# Patient Record
Sex: MALE | Race: WHITE | Employment: FULL TIME | URBAN - METROPOLITAN AREA
[De-identification: names, ages, dates, MRNs, and addresses within clinical notes are randomized per-mention and may not be internally consistent; named-entity substitution may affect disease eponyms.]

---

## 2017-01-24 PROBLEM — E78.2 MIXED HYPERLIPIDEMIA: Status: ACTIVE | Noted: 2017-01-24

## 2017-01-31 PROBLEM — R07.0 THROAT DISCOMFORT: Status: ACTIVE | Noted: 2017-01-31

## 2022-04-15 ENCOUNTER — OFFICE VISIT (OUTPATIENT)
Dept: FAMILY MEDICINE CLINIC | Facility: CLINIC | Age: 52
End: 2022-04-15
Payer: COMMERCIAL

## 2022-04-15 ENCOUNTER — LAB ENCOUNTER (OUTPATIENT)
Dept: LAB | Age: 52
End: 2022-04-15
Attending: FAMILY MEDICINE
Payer: COMMERCIAL

## 2022-04-15 VITALS
WEIGHT: 267 LBS | BODY MASS INDEX: 36.16 KG/M2 | SYSTOLIC BLOOD PRESSURE: 160 MMHG | HEIGHT: 72 IN | DIASTOLIC BLOOD PRESSURE: 95 MMHG | HEART RATE: 65 BPM

## 2022-04-15 DIAGNOSIS — E78.00 PURE HYPERCHOLESTEROLEMIA: ICD-10-CM

## 2022-04-15 DIAGNOSIS — R73.9 HYPERGLYCEMIA: ICD-10-CM

## 2022-04-15 DIAGNOSIS — Z12.11 COLON CANCER SCREENING: ICD-10-CM

## 2022-04-15 DIAGNOSIS — I10 PRIMARY HYPERTENSION: Primary | ICD-10-CM

## 2022-04-15 LAB
ALBUMIN SERPL-MCNC: 4.5 G/DL (ref 3.4–5)
ALBUMIN/GLOB SERPL: 1.4 {RATIO} (ref 1–2)
ALP LIVER SERPL-CCNC: 61 U/L
ALT SERPL-CCNC: 59 U/L
ANION GAP SERPL CALC-SCNC: 5 MMOL/L (ref 0–18)
AST SERPL-CCNC: 35 U/L (ref 15–37)
BILIRUB SERPL-MCNC: 1 MG/DL (ref 0.1–2)
BUN BLD-MCNC: 11 MG/DL (ref 7–18)
BUN/CREAT SERPL: 11.8 (ref 10–20)
CALCIUM BLD-MCNC: 9.9 MG/DL (ref 8.5–10.1)
CHLORIDE SERPL-SCNC: 105 MMOL/L (ref 98–112)
CHOLEST SERPL-MCNC: 224 MG/DL (ref ?–200)
CO2 SERPL-SCNC: 27 MMOL/L (ref 21–32)
CREAT BLD-MCNC: 0.93 MG/DL
EST. AVERAGE GLUCOSE BLD GHB EST-MCNC: 146 MG/DL (ref 68–126)
FASTING PATIENT LIPID ANSWER: YES
FASTING STATUS PATIENT QL REPORTED: YES
GLOBULIN PLAS-MCNC: 3.2 G/DL (ref 2.8–4.4)
GLUCOSE BLD-MCNC: 129 MG/DL (ref 70–99)
HBA1C MFR BLD: 6.7 % (ref ?–5.7)
HDLC SERPL-MCNC: 46 MG/DL (ref 40–59)
LDLC SERPL CALC-MCNC: 162 MG/DL (ref ?–100)
NONHDLC SERPL-MCNC: 178 MG/DL (ref ?–130)
OSMOLALITY SERPL CALC.SUM OF ELEC: 285 MOSM/KG (ref 275–295)
POTASSIUM SERPL-SCNC: 4.1 MMOL/L (ref 3.5–5.1)
PROT SERPL-MCNC: 7.7 G/DL (ref 6.4–8.2)
SODIUM SERPL-SCNC: 137 MMOL/L (ref 136–145)
TRIGL SERPL-MCNC: 91 MG/DL (ref 30–149)
VLDLC SERPL CALC-MCNC: 18 MG/DL (ref 0–30)

## 2022-04-15 PROCEDURE — 90750 HZV VACC RECOMBINANT IM: CPT | Performed by: FAMILY MEDICINE

## 2022-04-15 PROCEDURE — 3077F SYST BP >= 140 MM HG: CPT | Performed by: FAMILY MEDICINE

## 2022-04-15 PROCEDURE — 36415 COLL VENOUS BLD VENIPUNCTURE: CPT

## 2022-04-15 PROCEDURE — 99203 OFFICE O/P NEW LOW 30 MIN: CPT | Performed by: FAMILY MEDICINE

## 2022-04-15 PROCEDURE — 90471 IMMUNIZATION ADMIN: CPT | Performed by: FAMILY MEDICINE

## 2022-04-15 PROCEDURE — 83036 HEMOGLOBIN GLYCOSYLATED A1C: CPT

## 2022-04-15 PROCEDURE — 80061 LIPID PANEL: CPT

## 2022-04-15 PROCEDURE — 80053 COMPREHEN METABOLIC PANEL: CPT

## 2022-04-15 PROCEDURE — 3008F BODY MASS INDEX DOCD: CPT | Performed by: FAMILY MEDICINE

## 2022-04-15 PROCEDURE — 3080F DIAST BP >= 90 MM HG: CPT | Performed by: FAMILY MEDICINE

## 2022-04-15 RX ORDER — AMLODIPINE BESYLATE 5 MG/1
5 TABLET ORAL DAILY
COMMUNITY
Start: 2022-02-20 | End: 2022-04-15

## 2022-04-15 RX ORDER — OLMESARTAN MEDOXOMIL AND HYDROCHLOROTHIAZIDE 20/12.5 20; 12.5 MG/1; MG/1
1 TABLET ORAL DAILY
Qty: 90 TABLET | Refills: 2 | Status: SHIPPED | OUTPATIENT
Start: 2022-04-15 | End: 2023-04-10

## 2022-04-15 RX ORDER — AMLODIPINE BESYLATE 5 MG/1
5 TABLET ORAL DAILY
Qty: 90 TABLET | Refills: 2 | Status: SHIPPED | OUTPATIENT
Start: 2022-04-15

## 2022-04-29 ENCOUNTER — OFFICE VISIT (OUTPATIENT)
Dept: FAMILY MEDICINE CLINIC | Facility: CLINIC | Age: 52
End: 2022-04-29
Payer: COMMERCIAL

## 2022-04-29 VITALS
SYSTOLIC BLOOD PRESSURE: 128 MMHG | HEART RATE: 57 BPM | HEIGHT: 72 IN | DIASTOLIC BLOOD PRESSURE: 82 MMHG | BODY MASS INDEX: 36.25 KG/M2 | WEIGHT: 267.63 LBS

## 2022-04-29 DIAGNOSIS — E78.00 PURE HYPERCHOLESTEROLEMIA: ICD-10-CM

## 2022-04-29 DIAGNOSIS — I10 PRIMARY HYPERTENSION: Primary | ICD-10-CM

## 2022-04-29 PROCEDURE — 3079F DIAST BP 80-89 MM HG: CPT | Performed by: FAMILY MEDICINE

## 2022-04-29 PROCEDURE — 90471 IMMUNIZATION ADMIN: CPT | Performed by: FAMILY MEDICINE

## 2022-04-29 PROCEDURE — 90715 TDAP VACCINE 7 YRS/> IM: CPT | Performed by: FAMILY MEDICINE

## 2022-04-29 PROCEDURE — 3074F SYST BP LT 130 MM HG: CPT | Performed by: FAMILY MEDICINE

## 2022-04-29 PROCEDURE — 99213 OFFICE O/P EST LOW 20 MIN: CPT | Performed by: FAMILY MEDICINE

## 2022-04-29 PROCEDURE — 3008F BODY MASS INDEX DOCD: CPT | Performed by: FAMILY MEDICINE

## 2022-04-29 RX ORDER — ATORVASTATIN CALCIUM 10 MG/1
10 TABLET, FILM COATED ORAL DAILY
Qty: 90 TABLET | Refills: 3 | Status: SHIPPED | OUTPATIENT
Start: 2022-04-29

## 2022-07-02 ENCOUNTER — NURSE ONLY (OUTPATIENT)
Dept: FAMILY MEDICINE CLINIC | Facility: CLINIC | Age: 52
End: 2022-07-02
Payer: COMMERCIAL

## 2022-07-02 DIAGNOSIS — Z23 NEED FOR SHINGLES VACCINE: Primary | ICD-10-CM

## 2022-07-02 PROCEDURE — 90471 IMMUNIZATION ADMIN: CPT | Performed by: FAMILY MEDICINE

## 2022-07-02 PROCEDURE — 90750 HZV VACC RECOMBINANT IM: CPT | Performed by: FAMILY MEDICINE

## 2022-07-26 ENCOUNTER — TELEPHONE (OUTPATIENT)
Dept: FAMILY MEDICINE CLINIC | Facility: CLINIC | Age: 52
End: 2022-07-26

## 2022-07-26 NOTE — TELEPHONE ENCOUNTER
Left message to pt to call back H#, Attempted to call pt's wife cell # no answer/ vm. .     Too soon for refill, pt to f/u with pharm.      LR 4-15-22 # 90 + 2

## 2022-07-26 NOTE — TELEPHONE ENCOUNTER
Patient's wife states they already spoke to pharmacy and they informed her the patient does not have any refills left. Please advise.

## 2022-07-26 NOTE — TELEPHONE ENCOUNTER
Spoke with Leonard Henry from [facility],  verified. He stated it's too soon for refill, insurance will not pay until  for 90 days supply.

## 2022-07-26 NOTE — TELEPHONE ENCOUNTER
Perlamilo Bryanfletcher, pt's wife, on the phone stating pt needs a refill for the following medication. Perla Lyn is stating that Dr. Bishop Baptiste informed pt he will have refills for a whole year but when pt went to the pharmacy they informed him to call physicians office to prescribed more refills.      amLODIPine 5 MG Oral Tab

## 2022-10-28 ENCOUNTER — LAB ENCOUNTER (OUTPATIENT)
Dept: LAB | Age: 52
End: 2022-10-28
Attending: FAMILY MEDICINE
Payer: COMMERCIAL

## 2022-10-28 ENCOUNTER — OFFICE VISIT (OUTPATIENT)
Dept: FAMILY MEDICINE CLINIC | Facility: CLINIC | Age: 52
End: 2022-10-28
Payer: COMMERCIAL

## 2022-10-28 VITALS
HEART RATE: 61 BPM | BODY MASS INDEX: 36.68 KG/M2 | DIASTOLIC BLOOD PRESSURE: 78 MMHG | HEIGHT: 72 IN | WEIGHT: 270.81 LBS | SYSTOLIC BLOOD PRESSURE: 139 MMHG

## 2022-10-28 DIAGNOSIS — E78.00 PURE HYPERCHOLESTEROLEMIA: ICD-10-CM

## 2022-10-28 DIAGNOSIS — R73.03 PREDIABETES: ICD-10-CM

## 2022-10-28 DIAGNOSIS — Z12.11 COLON CANCER SCREENING: ICD-10-CM

## 2022-10-28 DIAGNOSIS — I10 PRIMARY HYPERTENSION: Primary | ICD-10-CM

## 2022-10-28 LAB
CHOLEST SERPL-MCNC: 191 MG/DL (ref ?–200)
EST. AVERAGE GLUCOSE BLD GHB EST-MCNC: 151 MG/DL (ref 68–126)
FASTING PATIENT LIPID ANSWER: YES
HBA1C MFR BLD: 6.9 % (ref ?–5.7)
HDLC SERPL-MCNC: 45 MG/DL (ref 40–59)
LDLC SERPL CALC-MCNC: 134 MG/DL (ref ?–100)
NONHDLC SERPL-MCNC: 146 MG/DL (ref ?–130)
TRIGL SERPL-MCNC: 65 MG/DL (ref 30–149)
VLDLC SERPL CALC-MCNC: 12 MG/DL (ref 0–30)

## 2022-10-28 PROCEDURE — 36415 COLL VENOUS BLD VENIPUNCTURE: CPT

## 2022-10-28 PROCEDURE — 3078F DIAST BP <80 MM HG: CPT | Performed by: FAMILY MEDICINE

## 2022-10-28 PROCEDURE — 3075F SYST BP GE 130 - 139MM HG: CPT | Performed by: FAMILY MEDICINE

## 2022-10-28 PROCEDURE — 99214 OFFICE O/P EST MOD 30 MIN: CPT | Performed by: FAMILY MEDICINE

## 2022-10-28 PROCEDURE — 3044F HG A1C LEVEL LT 7.0%: CPT | Performed by: FAMILY MEDICINE

## 2022-10-28 PROCEDURE — 83036 HEMOGLOBIN GLYCOSYLATED A1C: CPT

## 2022-10-28 PROCEDURE — 80061 LIPID PANEL: CPT

## 2022-10-28 PROCEDURE — 3008F BODY MASS INDEX DOCD: CPT | Performed by: FAMILY MEDICINE

## 2022-10-28 RX ORDER — AMLODIPINE BESYLATE 5 MG/1
5 TABLET ORAL DAILY
Qty: 90 TABLET | Refills: 2 | Status: SHIPPED | OUTPATIENT
Start: 2022-10-28

## 2022-10-28 RX ORDER — ATORVASTATIN CALCIUM 10 MG/1
10 TABLET, FILM COATED ORAL DAILY
Qty: 90 TABLET | Refills: 3 | Status: SHIPPED | OUTPATIENT
Start: 2022-10-28

## 2022-10-28 RX ORDER — OLMESARTAN MEDOXOMIL AND HYDROCHLOROTHIAZIDE 20/12.5 20; 12.5 MG/1; MG/1
1 TABLET ORAL DAILY
Qty: 90 TABLET | Refills: 2 | Status: SHIPPED | OUTPATIENT
Start: 2022-10-28 | End: 2023-10-23

## 2022-11-01 ENCOUNTER — TELEPHONE (OUTPATIENT)
Dept: FAMILY MEDICINE CLINIC | Facility: CLINIC | Age: 52
End: 2022-11-01

## 2022-11-01 NOTE — TELEPHONE ENCOUNTER
Cristhian, [email or web address], [place]  Call from patient's wife Juan C Mccain, on CANDEL[place]. Verified patient name/. Informed of test results show new diagnosis of diabetes. Scheduled appointment.

## 2022-11-11 ENCOUNTER — OFFICE VISIT (OUTPATIENT)
Dept: FAMILY MEDICINE CLINIC | Facility: CLINIC | Age: 52
End: 2022-11-11
Payer: COMMERCIAL

## 2022-11-11 VITALS
SYSTOLIC BLOOD PRESSURE: 163 MMHG | DIASTOLIC BLOOD PRESSURE: 80 MMHG | HEIGHT: 72 IN | BODY MASS INDEX: 36.57 KG/M2 | HEART RATE: 60 BPM | WEIGHT: 270 LBS

## 2022-11-11 DIAGNOSIS — E11.9 TYPE 2 DIABETES MELLITUS WITHOUT COMPLICATION, WITHOUT LONG-TERM CURRENT USE OF INSULIN (HCC): Primary | ICD-10-CM

## 2022-11-11 PROBLEM — E66.01 MORBID (SEVERE) OBESITY DUE TO EXCESS CALORIES (HCC): Status: ACTIVE | Noted: 2022-11-11

## 2022-11-11 PROCEDURE — 3008F BODY MASS INDEX DOCD: CPT | Performed by: FAMILY MEDICINE

## 2022-11-11 PROCEDURE — 99214 OFFICE O/P EST MOD 30 MIN: CPT | Performed by: FAMILY MEDICINE

## 2022-11-11 PROCEDURE — 3079F DIAST BP 80-89 MM HG: CPT | Performed by: FAMILY MEDICINE

## 2022-11-11 PROCEDURE — 3077F SYST BP >= 140 MM HG: CPT | Performed by: FAMILY MEDICINE

## 2022-11-22 DIAGNOSIS — E11.65 TYPE 2 DIABETES MELLITUS WITH HYPERGLYCEMIA, WITHOUT LONG-TERM CURRENT USE OF INSULIN (HCC): Primary | ICD-10-CM

## 2022-11-23 ENCOUNTER — HOSPITAL ENCOUNTER (OUTPATIENT)
Dept: ENDOCRINOLOGY | Age: 52
Discharge: HOME | End: 2022-11-23
Attending: FAMILY MEDICINE
Payer: COMMERCIAL

## 2022-11-23 DIAGNOSIS — E11.65 TYPE 2 DIABETES MELLITUS WITH HYPERGLYCEMIA, WITHOUT LONG-TERM CURRENT USE OF INSULIN (HCC): Primary | ICD-10-CM

## 2022-11-23 NOTE — PROGRESS NOTES
Holly Swan  : 1970 attended individual initial assessment for Diabetes Education:    Date: 2022   Start time: 9:30 am End time: 10:30 am    HbA1c (%)   Date Value   10/14/2016 5.7 (H)     HgbA1C (%)   Date Value   10/28/2022 6.9 (H)        Assessment: 51 y/o male presents for DSMT education and is motivated to lower his AIC and gain better glucose control  Diet Hx: B:Coffee, with whole grain toast and peanut butter  L: 2 tortillas and leftovers chicken   D: same as lunch   Pt will increase his daily increased     Education provided on the below topics:     Diabetes Overview, pathophysiology, A1C results and treatment options for diabetes self-management:   Described basic process and treatment options for diabetes self-management. Healthy Eating:  Obtained usual diet history. Instructed on Basic Diet Guidelines which includes eating 3 meals/day with HS snack. Eat moderate amounts at consistent times from day to day. Limit/avoid sweets. Being Active: Encouraged Physical Activity    Taking Medications: Reviewed current diabetes medications (if applicable)    Monitoring: Instructed/reinforced blood glucose monitoring, testing schedules and target goals:   Fasting / Pre-meal  mg/dL 2 Hour Post-prandial <180 mg/dL  Demonstrated ability to perform blood glucose testing. Problem Solving: Prevention, detection and treatment of acute complications: taught symptoms of hypoglycemia, hyperglycemia, how to treat low blood sugar (Rule of 15) and actions for lowering high blood glucose levels. Initiated goal setting process and will continue to refine throughout class series. Recommendations:      Monitor blood glucose as directed. Follow Basic Diet Guidelines. Attend 1:1 education classes with Diabetes educator    Prescriptions sent to preferred pharmacy for blood glucose meter, test strips and lancets per protocol. Written materials provided for all areas covered.     Patient verbalized understanding and has no further questions at this time.     Robyn Jorgensen, RN,MSN

## 2022-12-09 ENCOUNTER — HOSPITAL ENCOUNTER (OUTPATIENT)
Dept: ENDOCRINOLOGY | Age: 52
Discharge: HOME | End: 2022-12-09
Attending: FAMILY MEDICINE
Payer: COMMERCIAL

## 2022-12-09 DIAGNOSIS — E11.65 TYPE 2 DIABETES MELLITUS WITH HYPERGLYCEMIA, WITHOUT LONG-TERM CURRENT USE OF INSULIN (HCC): Primary | ICD-10-CM

## 2022-12-09 NOTE — PROGRESS NOTES
Rachel Morin  DOB2/16/1970 attended Step 3 Group Diabetes Education Class:    Date: 12/9/2022  Start time: 2:45 End time: 3:45 pm    Wt: Wt Readings from Last 1 Encounters:  11/11/22 : 270 lb    Weight change since start of program:lost 5 pounds     Blood Glucose: Controlled: Stable     Healthy Eating:  Reviewed Basic Diet Guidelines as foundation of diabetes meal planning. Reinforced the balanced plate method. Taught nutrition basics defining carbohydrate, protein, and fat. Taught label reading, including an option to count carbohydrate grams or servings. Provided patient with goals for carbohydrate grams/choices for meals and snacks. Discussed sugar substitutes, ETOH and effect on blood glucose. Jak's helpful for smart phones, as well as websites for examining carbohydrate levels provided. Reviewed and reinforced macronutrient's, carbohydrate counting and meal planning. Problem Solving:  Reinforced signs, symptoms, and treatment of hypoglycemia using the Rule of 15. Discussed impact of different food items and portion sizes on blood glucose levels. Discussed blood glucose target of 180 mg/dL, if testing 2 hours post meal.    Monitoring:  Reviewed blood glucose records and importance of tracking foods/blood glucose levels. Reinforced the importance of taking medications as prescribed. Behavior Change:  Reviewed and updated individual goals and action plan set by patient. Addressed barriers to tracking foods/blood glucose levels and following guidelines presented/achieving goals, as a group discussion. Recommendations: Follow individual meal plan recommended by Educator (Sy Muñoz). Continue implementing individual goals. Attend remaining sessions. Begin implementing carbohydrate counting and continue dietary and blood glucose tracking. Written materials provided for all areas covered.     Patient verbalized understanding and has no further questions currently     Kim , RN,MSN

## 2023-01-25 RX ORDER — ATORVASTATIN CALCIUM 10 MG/1
10 TABLET, FILM COATED ORAL DAILY
Qty: 90 TABLET | Refills: 3 | OUTPATIENT
Start: 2023-01-25

## 2023-01-25 RX ORDER — OLMESARTAN MEDOXOMIL AND HYDROCHLOROTHIAZIDE 20/12.5 20; 12.5 MG/1; MG/1
1 TABLET ORAL DAILY
Qty: 90 TABLET | Refills: 2 | OUTPATIENT
Start: 2023-01-25 | End: 2024-01-20

## 2023-01-25 RX ORDER — AMLODIPINE BESYLATE 5 MG/1
5 TABLET ORAL DAILY
Qty: 90 TABLET | Refills: 2 | OUTPATIENT
Start: 2023-01-25

## 2023-02-10 ENCOUNTER — HOSPITAL ENCOUNTER (OUTPATIENT)
Dept: ENDOCRINOLOGY | Age: 53
Discharge: HOME | End: 2023-02-10
Attending: FAMILY MEDICINE
Payer: COMMERCIAL

## 2023-02-10 DIAGNOSIS — E11.65 TYPE 2 DIABETES MELLITUS WITH HYPERGLYCEMIA, WITHOUT LONG-TERM CURRENT USE OF INSULIN (HCC): Primary | ICD-10-CM

## 2023-02-10 NOTE — PROGRESS NOTES
Cheri Martínez  DOB2/16/1970 attended Step 4 1:1 Diabetes Education Class:     Date: 2/10/2023  Start time: 2:15 pm End time: 3:15 pm    Wt: Wt Readings from Last 1 Encounters:  11/11/22 : 270 lb    Weight change since start of program: lost 10 lb weight 266.2 lb    Blood Glucose: Not controlled: Progressing toward goal, but doing much better    Reviewed information covered in previous classes including benefits of maintaining good blood glucose control and healthy weight in decreasing diabetes complications. Prevention, detection, and treatment of chronic complications  Reviewed how to reduce risks of complications including eye, foot, dental, renal, and cardiovascular. Discussed American Diabetes Association guidelines for testing including eye exam, lipid panels, urine protein tests, dental and foot exams. Encouraged to get annual flu shot. Discussed importance of immunizations, vaccinations, and guidelines for sick day management. Discussed wearing medical ID. Problem Solving  Discussed signs, symptoms, and prevention of DKA and HHNS. Discussed disaster preparedness and Diabetes. Discussed Diabetes medication assistance and supply management. Healthy Eating  Reviewed and reinforced macronutrients, carbohydrate counting, and meal planning. Reviewed meal planning methods. Discussed healthy eating approaches for dining out, holidays and special occasions. Provided tips on how family members can support healthy changes in diet. Behavior Change  Reviewed and updated individual goals and action plan set by patient. Recommendations:  Monitor blood glucose as directed. Follow individual meal plan and continue dietary tracking. Attend remaining sessions. Continue to implement individual goals. Written materials provided for all areas covered. Patient verbalized understanding and has no further questions at this time.      Pallavi Colvin, RN,MSN

## 2023-02-22 RX ORDER — BLOOD SUGAR DIAGNOSTIC
STRIP MISCELLANEOUS
Qty: 100 STRIP | Refills: 3 | Status: SHIPPED | OUTPATIENT
Start: 2023-02-22

## 2023-02-22 NOTE — TELEPHONE ENCOUNTER
Refill passed per [facility], [facility] protocol.      11/23/2022 ONETOUCH VERIO TEST STRIP [id number] [place], [address] [place], [place] [phone], [phone] 100.00 each 0 90 TEST DAILY      Source: Surescripts (Fill History, Ambulatory)  Authorized by: Kenna Mccabe            Requested Prescriptions   Pending Prescriptions Disp [address] [place] [address] In [address] [place] [address] [Pharmacy Med Name: ONE TOUCH VERIO TEST STRIP] 100 strip 0     Sig: TEST DAILY       Diabetic Supplies Protocol Passed - 2/22/2023  8:56 AM        Passed - In person appointment or virtual visit in the past 12 mos or appointment in next 3 mos     Recent Outpatient Visits              3 months ago Type 2 diabetes mellitus without complication, without long-term current use of insulin [facility]    Sumi Ch Elmon Hutching, MD    Office Visit    3 months ago Primary hypertension    [address] [facility], Augusta Yadav MD    Office Visit    7 months ago Need for shingles vaccine    [facility], Sumi [place], [place]    Nurse Only    9 months ago Primary hypertension    Sumi Ch, [place] Yadav MD    Office Visit    10 months ago Primary hypertension    Sumi Ch, [place]a Yadav MD    Office Visit          Future Appointments         Provider Department Appt Notes    In 2 weeks MD Julio Fletcher [address], [place] 4 MONTHS FU    In 3 weeks ALIYA [facility] in [place] step 5                    Recent Outpatient Visits              3 months ago Type 2 diabetes mellitus without complication, without long-term current use of insulin [facility])    Sumi Ch Elmon Hutching, MD    Office Visit    3 months ago [facility] hypertension    [facility] [place], Deep Ford MD    Office Visit    7 months ago Need for shingles vaccine    [address] [facility], [place]    Nurse Only    9 months ago Primary hypertension    [address], Sumi Ireland, Deep Ford MD    Office Visit    10 months ago Primary hypertension    [address], [place]i [place], Federica Pemberton MD    Office Visit            Future Appointments         Provider Department Appt Notes    In 2 weeks [facility]eta Ford MD [address] [facility]    In 3 weeks Panchito oCle, RN [facility] in [place] step 5

## 2023-03-02 RX ORDER — LANCETS 33 GAUGE
1 EACH MISCELLANEOUS DAILY
Qty: 100 EACH | Refills: 3 | Status: SHIPPED | OUTPATIENT
Start: 2023-03-02

## 2023-03-02 NOTE — TELEPHONE ENCOUNTER
Refill passed per [facility], [facility] protocol.       Requested Prescriptions   Pending Prescriptions Disp Refills    [address] Does not apply [address] [Pharmacy Med Name: ONE TOUCH DELICA PLUS 60C LANC]  0     Sig: TEST EVERY DAY       Diabetic Supplies Protocol Passed - 3/2/2023 11:52 AM        Passed - In person appointment or virtual visit in the past 12 mos or appointment in next 3 mos     Recent Outpatient Visits              3 months ago Type 2 diabetes mellitus without complication, without long-term current use of insulin ([facility]    [address], [address], Ruddy Pollock MD    Office Visit    4 months ago Primary hypertension    [facility], Sumi [place], Ruddy Pollock MD    Office Visit    8 months ago Need for shingles vaccine    [address] [place] [address], [place]    Nurse Only    10 months ago Primary hypertension    [address], Sumi [place], Ruddy Pollock MD    Office Visit    10 months ago Primary hypertension    [address], [place]i [place], Ruddy Pollock MD    Office Visit          Future Appointments         Provider Department Appt Notes    In 1 week Yennifer Gutiérrez MD [address], [address], [place] 4 MONTHS FU    In 2 weeks ALIYA Krishna [facility] in [place] step 5                     Future Appointments         Provider Department Appt Notes    In 1 week Yennifer Gutiérrez MD [address], [address], [place] 4 MONTHS FU    In 2 weeks ALIYA Krishna [facility] in [place] step 5            Recent Outpatient Visits              3 months ago Type 2 diabetes mellitus without complication, without long-term current use of insulin [facility])    [address], [place], Ruddy Pollock MD    Office Visit    4 months ago Primary hypertension Joanna Sparks MD    Office Visit    8 months ago Need for shingles vaccine    [place]    Nurse Only    10 months ago Primary hypertension    [facility], Deep Rey MD    Office Visit    10 months ago Primary hypertension    Travon Wilson, Gui Bella MD    Office Visit

## 2023-03-31 ENCOUNTER — OFFICE VISIT (OUTPATIENT)
Dept: FAMILY MEDICINE CLINIC | Facility: CLINIC | Age: 53
End: 2023-03-31

## 2023-03-31 ENCOUNTER — LAB ENCOUNTER (OUTPATIENT)
Dept: LAB | Age: 53
End: 2023-03-31
Attending: FAMILY MEDICINE
Payer: COMMERCIAL

## 2023-03-31 VITALS
HEART RATE: 66 BPM | HEIGHT: 72 IN | SYSTOLIC BLOOD PRESSURE: 178 MMHG | WEIGHT: 259 LBS | DIASTOLIC BLOOD PRESSURE: 85 MMHG | BODY MASS INDEX: 35.08 KG/M2

## 2023-03-31 DIAGNOSIS — E78.00 PURE HYPERCHOLESTEROLEMIA: ICD-10-CM

## 2023-03-31 DIAGNOSIS — E11.9 TYPE 2 DIABETES MELLITUS WITHOUT COMPLICATION, WITHOUT LONG-TERM CURRENT USE OF INSULIN (HCC): ICD-10-CM

## 2023-03-31 DIAGNOSIS — I10 PRIMARY HYPERTENSION: ICD-10-CM

## 2023-03-31 DIAGNOSIS — Z12.11 COLON CANCER SCREENING: ICD-10-CM

## 2023-03-31 DIAGNOSIS — S39.012A STRAIN OF LUMBAR REGION, INITIAL ENCOUNTER: ICD-10-CM

## 2023-03-31 DIAGNOSIS — E11.9 TYPE 2 DIABETES MELLITUS WITHOUT COMPLICATION, WITHOUT LONG-TERM CURRENT USE OF INSULIN (HCC): Primary | ICD-10-CM

## 2023-03-31 LAB
ALBUMIN SERPL-MCNC: 4.4 G/DL (ref 3.4–5)
ALBUMIN/GLOB SERPL: 1.2 {RATIO} (ref 1–2)
ALP LIVER SERPL-CCNC: 46 U/L
ALT SERPL-CCNC: 47 U/L
ANION GAP SERPL CALC-SCNC: 7 MMOL/L (ref 0–18)
AST SERPL-CCNC: 20 U/L (ref 15–37)
BILIRUB SERPL-MCNC: 0.7 MG/DL (ref 0.1–2)
BUN BLD-MCNC: 12 MG/DL (ref 7–18)
BUN/CREAT SERPL: 13.6 (ref 10–20)
CALCIUM BLD-MCNC: 9.8 MG/DL (ref 8.5–10.1)
CHLORIDE SERPL-SCNC: 108 MMOL/L (ref 98–112)
CO2 SERPL-SCNC: 26 MMOL/L (ref 21–32)
CREAT BLD-MCNC: 0.88 MG/DL
CREAT UR-SCNC: 155 MG/DL
FASTING STATUS PATIENT QL REPORTED: YES
GFR SERPLBLD BASED ON 1.73 SQ M-ARVRAT: 104 ML/MIN/1.73M2 (ref 60–?)
GLOBULIN PLAS-MCNC: 3.8 G/DL (ref 2.8–4.4)
GLUCOSE BLD-MCNC: 133 MG/DL (ref 70–99)
MICROALBUMIN UR-MCNC: 6.18 MG/DL
MICROALBUMIN/CREAT 24H UR-RTO: 39.9 UG/MG (ref ?–30)
OSMOLALITY SERPL CALC.SUM OF ELEC: 294 MOSM/KG (ref 275–295)
POTASSIUM SERPL-SCNC: 4.2 MMOL/L (ref 3.5–5.1)
PROT SERPL-MCNC: 8.2 G/DL (ref 6.4–8.2)
SODIUM SERPL-SCNC: 141 MMOL/L (ref 136–145)

## 2023-03-31 PROCEDURE — 80053 COMPREHEN METABOLIC PANEL: CPT

## 2023-03-31 PROCEDURE — 3079F DIAST BP 80-89 MM HG: CPT | Performed by: FAMILY MEDICINE

## 2023-03-31 PROCEDURE — 99214 OFFICE O/P EST MOD 30 MIN: CPT | Performed by: FAMILY MEDICINE

## 2023-03-31 PROCEDURE — 3061F NEG MICROALBUMINURIA REV: CPT | Performed by: FAMILY MEDICINE

## 2023-03-31 PROCEDURE — 3008F BODY MASS INDEX DOCD: CPT | Performed by: FAMILY MEDICINE

## 2023-03-31 PROCEDURE — 3077F SYST BP >= 140 MM HG: CPT | Performed by: FAMILY MEDICINE

## 2023-03-31 PROCEDURE — 82043 UR ALBUMIN QUANTITATIVE: CPT

## 2023-03-31 PROCEDURE — 36415 COLL VENOUS BLD VENIPUNCTURE: CPT

## 2023-03-31 PROCEDURE — 3060F POS MICROALBUMINURIA REV: CPT | Performed by: FAMILY MEDICINE

## 2023-03-31 PROCEDURE — 82570 ASSAY OF URINE CREATININE: CPT

## 2023-03-31 RX ORDER — ATORVASTATIN CALCIUM 10 MG/1
10 TABLET, FILM COATED ORAL DAILY
Qty: 90 TABLET | Refills: 3 | Status: SHIPPED | OUTPATIENT
Start: 2023-03-31

## 2023-03-31 RX ORDER — OLMESARTAN MEDOXOMIL AND HYDROCHLOROTHIAZIDE 20/12.5 20; 12.5 MG/1; MG/1
1 TABLET ORAL DAILY
Qty: 90 TABLET | Refills: 2 | Status: SHIPPED | OUTPATIENT
Start: 2023-03-31 | End: 2024-03-25

## 2023-03-31 RX ORDER — AMLODIPINE BESYLATE 5 MG/1
5 TABLET ORAL DAILY
Qty: 90 TABLET | Refills: 2 | Status: SHIPPED | OUTPATIENT
Start: 2023-03-31

## 2023-03-31 RX ORDER — CYCLOBENZAPRINE HCL 5 MG
5 TABLET ORAL 3 TIMES DAILY PRN
Qty: 30 TABLET | Refills: 0 | Status: SHIPPED | OUTPATIENT
Start: 2023-03-31

## 2023-04-27 ENCOUNTER — LAB ENCOUNTER (OUTPATIENT)
Dept: LAB | Age: 53
End: 2023-04-27
Attending: FAMILY MEDICINE
Payer: COMMERCIAL

## 2023-04-27 LAB — HEMOCCULT STL QL: NEGATIVE

## 2023-04-27 PROCEDURE — 82274 ASSAY TEST FOR BLOOD FECAL: CPT | Performed by: FAMILY MEDICINE

## 2023-05-11 ENCOUNTER — NURSE ONLY (OUTPATIENT)
Facility: CLINIC | Age: 53
End: 2023-05-11

## 2023-05-11 NOTE — PROGRESS NOTES
Called patient for a scheduled telephone colon screening.  [facility]    Call assist with [address] ID #[id number]

## 2023-05-11 NOTE — PROGRESS NOTES
Patient did not call back for TCS appt    CCS-If he returns call please r/s tcs appt, if appropriate.

## 2023-07-01 ENCOUNTER — OFFICE VISIT (OUTPATIENT)
Dept: FAMILY MEDICINE CLINIC | Facility: CLINIC | Age: 53
End: 2023-07-01

## 2023-07-01 VITALS
BODY MASS INDEX: 35.21 KG/M2 | SYSTOLIC BLOOD PRESSURE: 132 MMHG | HEART RATE: 62 BPM | HEIGHT: 72 IN | WEIGHT: 260 LBS | DIASTOLIC BLOOD PRESSURE: 78 MMHG

## 2023-07-01 DIAGNOSIS — E11.9 TYPE 2 DIABETES MELLITUS WITHOUT COMPLICATION, WITHOUT LONG-TERM CURRENT USE OF INSULIN (HCC): Primary | ICD-10-CM

## 2023-07-01 DIAGNOSIS — E78.00 PURE HYPERCHOLESTEROLEMIA: ICD-10-CM

## 2023-07-01 DIAGNOSIS — I10 PRIMARY HYPERTENSION: ICD-10-CM

## 2023-07-01 LAB
CARTRIDGE LOT#: ABNORMAL NUMERIC
HEMOGLOBIN A1C: 6.2 % (ref 4.3–5.6)

## 2023-07-01 PROCEDURE — 99213 OFFICE O/P EST LOW 20 MIN: CPT | Performed by: FAMILY MEDICINE

## 2023-07-01 PROCEDURE — 3078F DIAST BP <80 MM HG: CPT | Performed by: FAMILY MEDICINE

## 2023-07-01 PROCEDURE — 3075F SYST BP GE 130 - 139MM HG: CPT | Performed by: FAMILY MEDICINE

## 2023-07-01 PROCEDURE — 3044F HG A1C LEVEL LT 7.0%: CPT | Performed by: FAMILY MEDICINE

## 2023-07-01 PROCEDURE — 3008F BODY MASS INDEX DOCD: CPT | Performed by: FAMILY MEDICINE

## 2023-07-01 PROCEDURE — 83036 HEMOGLOBIN GLYCOSYLATED A1C: CPT | Performed by: FAMILY MEDICINE

## 2023-07-01 RX ORDER — OLMESARTAN MEDOXOMIL AND HYDROCHLOROTHIAZIDE 20/12.5 20; 12.5 MG/1; MG/1
1 TABLET ORAL DAILY
Qty: 90 TABLET | Refills: 2 | Status: SHIPPED | OUTPATIENT
Start: 2023-07-01 | End: 2024-06-25

## 2023-07-01 RX ORDER — AMLODIPINE BESYLATE 5 MG/1
5 TABLET ORAL DAILY
Qty: 90 TABLET | Refills: 2 | Status: SHIPPED | OUTPATIENT
Start: 2023-07-01

## 2023-07-01 RX ORDER — ATORVASTATIN CALCIUM 10 MG/1
10 TABLET, FILM COATED ORAL DAILY
Qty: 90 TABLET | Refills: 3 | Status: SHIPPED | OUTPATIENT
Start: 2023-07-01

## 2023-07-01 RX ORDER — LANCETS 33 GAUGE
1 EACH MISCELLANEOUS DAILY
Qty: 100 EACH | Refills: 3 | Status: SHIPPED | OUTPATIENT
Start: 2023-07-01

## 2023-07-01 RX ORDER — BLOOD SUGAR DIAGNOSTIC
STRIP MISCELLANEOUS
Qty: 100 STRIP | Refills: 3 | Status: SHIPPED | OUTPATIENT
Start: 2023-07-01

## 2023-09-27 NOTE — TELEPHONE ENCOUNTER
Current Outpatient Medications:       Glucose Blood (ONETOUCH VERIO) In Vitro Strip, Test daily, Disp: 100 strip, Rfl: 3           Alternative requested     One touch not covered anymore

## 2023-09-28 RX ORDER — BLOOD SUGAR DIAGNOSTIC
STRIP MISCELLANEOUS
Qty: 100 STRIP | Refills: 3 | OUTPATIENT
Start: 2023-09-28

## 2023-09-28 RX ORDER — LANCETS 30 GAUGE
EACH MISCELLANEOUS
Qty: 100 EACH | Refills: 3 | Status: SHIPPED | OUTPATIENT
Start: 2023-09-28

## 2023-09-28 NOTE — TELEPHONE ENCOUNTER
Refill passed per [address] protocol.   Requested Prescriptions   Pending Prescriptions Disp Refills    Lancets Does not apply Misc 100 each 3     Sig: Test once daily       Diabetic Supplies Protocol Passed - 9/27/2023 10:54 AM        Passed - In person appointment or virtual visit in the past 12 mos or appointment in next 3 mos     Recent Outpatient Visits              2 months ago Type 2 diabetes mellitus without complication, without long-term current use of insulin ([address]    [address], [address], Bj Gunderson MD    Office Visit    4 months ago     [address], [address], [facility]    Nurse Only    6 months ago Type 2 diabetes mellitus without complication, without long-term current use of insulin ([address]    [address], [address], Bj Gunderson MD    Office Visit    10 months ago Type 2 diabetes mellitus without complication, without long-term current use of insulin [facility])    [address], [address], Bj Gunderson MD    Office Visit    11 months ago Primary hypertension    [address], [address], Bj Gunderson MD    Office Visit                       Refused Prescriptions Disp Refills    Glucose Blood (Suzette Brown) In Vitro Strip 100 strip 3     Sig: Test once daily       Diabetic Supplies Protocol Passed - 9/27/2023 10:54 AM        Passed - In person appointment or virtual visit in the past 12 mos or appointment in next 3 mos     Recent Outpatient Visits              2 months ago Type 2 diabetes mellitus without complication, without long-term current use of insulin ([address]    [address], [address], Bj Gunderson MD    Office Visit    4 months ago     [address], [address], [facility]    Nurse Only    6 months ago Type 2 diabetes mellitus without complication, without long-term current use of insulin ([address] [facility], Deep Ford MD    Office Visit    10 months ago Type 2 diabetes mellitus without complication, without long-term current use of insulin [facility])    [address], Federica Pemberton MD    Office Visit    11 months ago Primary hypertension    [address], Federica Pemberton MD    Office Visit                         Recent Outpatient Visits              2 months ago Type 2 diabetes mellitus without complication, without long-term current use of insulin [facility])    [address], Federica Pemberton MD    Office Visit    4 months ago     Melquiades Cabrera, [address], [place]    Nurse Only    6 months ago Type 2 diabetes mellitus without complication, without long-term current use of insulin [facility])    [address], Federica Pemberton MD    Office Visit    10 months ago Type 2 diabetes mellitus without complication, without long-term current use of insulin [facility])    [address], Federica Pemberton MD    Office Visit    11 months ago Primary hypertension    [address] [facility], Federica Pemberton MD    Office Visit

## 2023-12-30 ENCOUNTER — OFFICE VISIT (OUTPATIENT)
Dept: FAMILY MEDICINE CLINIC | Facility: CLINIC | Age: 53
End: 2023-12-30
Payer: COMMERCIAL

## 2023-12-30 VITALS
WEIGHT: 270 LBS | DIASTOLIC BLOOD PRESSURE: 68 MMHG | BODY MASS INDEX: 36.57 KG/M2 | RESPIRATION RATE: 16 BRPM | HEART RATE: 67 BPM | SYSTOLIC BLOOD PRESSURE: 139 MMHG | HEIGHT: 72 IN

## 2023-12-30 DIAGNOSIS — E11.9 TYPE 2 DIABETES MELLITUS WITHOUT COMPLICATION, WITHOUT LONG-TERM CURRENT USE OF INSULIN (HCC): Primary | ICD-10-CM

## 2023-12-30 DIAGNOSIS — E78.00 PURE HYPERCHOLESTEROLEMIA: ICD-10-CM

## 2023-12-30 PROCEDURE — 3008F BODY MASS INDEX DOCD: CPT | Performed by: FAMILY MEDICINE

## 2023-12-30 PROCEDURE — 3075F SYST BP GE 130 - 139MM HG: CPT | Performed by: FAMILY MEDICINE

## 2023-12-30 PROCEDURE — 99214 OFFICE O/P EST MOD 30 MIN: CPT | Performed by: FAMILY MEDICINE

## 2023-12-30 PROCEDURE — 3078F DIAST BP <80 MM HG: CPT | Performed by: FAMILY MEDICINE

## 2023-12-30 RX ORDER — SEMAGLUTIDE 0.68 MG/ML
INJECTION, SOLUTION SUBCUTANEOUS
Qty: 1 EACH | Refills: 12 | Status: SHIPPED | OUTPATIENT
Start: 2023-12-30

## 2023-12-30 RX ORDER — ATORVASTATIN CALCIUM 20 MG/1
20 TABLET, FILM COATED ORAL DAILY
Qty: 90 TABLET | Refills: 3 | Status: SHIPPED | OUTPATIENT
Start: 2023-12-30

## 2024-01-02 ENCOUNTER — TELEPHONE (OUTPATIENT)
Dept: FAMILY MEDICINE CLINIC | Facility: CLINIC | Age: 54
End: 2024-01-02

## 2024-01-02 NOTE — TELEPHONE ENCOUNTER
Called pharmacy, there is no PCN so covermymeds is not pulling a form    Pharmacy is unable to pull PCN or Prior auth phone number  Will have to try again on covermymeds or patient may need to call his plan.

## 2024-01-02 NOTE — TELEPHONE ENCOUNTER
Called [employer], transferred to P#[phone]  Option 3, 3    Spoke to Steve  Requested the form to be sent to complete  Await form

## 2024-01-02 NOTE — TELEPHONE ENCOUNTER
Prior Authorization      semaglutide (OZEMPIC, 0.25 OR 0.5 MG/DOSE,) 2 MG/3ML Subcutaneous Solution Pen-injector, Use 0.25 mg Q week for 4 weeks, then 0.5 mg Q week, Disp: 1 each, Rfl: 12    [id number]

## 2024-01-03 NOTE — TELEPHONE ENCOUNTER
Ozempic has been denied, patient does not meet criteria must have tried metformin, glipizide/glyburide or insulin

## 2024-01-08 DIAGNOSIS — I10 PRIMARY HYPERTENSION: ICD-10-CM

## 2024-01-09 RX ORDER — AMLODIPINE BESYLATE 5 MG/1
5 TABLET ORAL DAILY
Qty: 90 TABLET | Refills: 3 | Status: SHIPPED | OUTPATIENT
Start: 2024-01-09

## 2024-01-09 NOTE — TELEPHONE ENCOUNTER
Please review; protocol failed/No Protocol  *no pending labs for cmp   Requested Prescriptions   Pending Prescriptions Disp Refills    AMLODIPINE 5 MG Oral Tab [Pharmacy Med Name: AMLODIPINE BESYLATE 5 MG TAB] 90 tablet 2     Sig: Take 1 tablet (5 mg total) by mouth daily.       Hypertensive Medications Protocol Failed - 1/8/2024 12:04 AM        Failed - CMP or BMP in past 6 months     No results found for this or any previous visit (from the past 4392 hour(s)).            Passed - In person appointment in the past 12 or next 3 months     Recent Outpatient Visits              1 week ago Type 2 diabetes mellitus without complication, without long-term current use of insulin ([facility])    [facility] [address] [place]    Office Visit    6 months ago Type 2 diabetes mellitus without complication, without long-term current use of insulin ([facility])    [facility] [address], [place]    Office Visit    8 months ago     [facility] [place]    Nurse Only    9 months ago Type 2 diabetes mellitus without complication, without long-term current use of insulin ([facility])    [facility] [address] Ricardo, MD    Office Visit    1 year ago Type 2 diabetes mellitus without complication, without long-term current use of insulin ([facility])    [facility] [address] Gu[place] Ricardo, MD    Office Visit                      Passed - Last BP reading less than 140/90     BP Readings from Last 1 Encounters:   12/30/23 139/68               Passed - In person appointment or virtual visit in the past 6 months     Recent Outpatient Visits              1 week ago Type 2 diabetes mellitus without complication, without long-term current use of insulin ([facility])    [facility] [address] Gu[address] Ricardo, MD    Office Visit    6 months ago Type 2  diabetes mellitus without complication, without long-term current use of insulin ([facility])    [facility] [address], [place]    Office Visit    8 months ago     [facility] [place]    Nurse Only    9 months ago Type 2 diabetes mellitus without complication, without long-term current use of insulin ([facility])    [facility] [address], MD    Office Visit    1 year ago Type 2 diabetes mellitus without complication, without long-term current use of insulin ([facility])    [facility] [address], [place]    Office Visit                      Passed - EGFRCR or GFRNAA > 50     GFR Evaluation  EGFRCR: 104 , resulted on 3/31/2023               Recent Outpatient Visits              1 week ago Type 2 diabetes mellitus without complication, without long-term current use of insulin ([facility])    [facility] [address], [place]    Office Visit    6 months ago Type 2 diabetes mellitus without complication, without long-term current use of insulin ([facility])    [facility]    [facility]    8 months ago     [facility] [place]    Nurse Only    9 months ago Type 2 diabetes mellitus without complication, without long-term current use of insulin ([facility])    [facility] [address], MD    Office Visit    1 year ago Type 2 diabetes mellitus without complication, without long-term current use of insulin ([facility])    [facility] [address], MD    Office Visit

## 2024-02-02 ENCOUNTER — LAB ENCOUNTER (OUTPATIENT)
Dept: LAB | Age: 54
End: 2024-02-02
Attending: FAMILY MEDICINE
Payer: COMMERCIAL

## 2024-02-02 DIAGNOSIS — E78.00 PURE HYPERCHOLESTEROLEMIA: ICD-10-CM

## 2024-02-02 LAB
EST. AVERAGE GLUCOSE BLD GHB EST-MCNC: 131 MG/DL (ref 68–126)
HBA1C MFR BLD: 6.2 % (ref ?–5.7)

## 2024-02-02 PROCEDURE — 36415 COLL VENOUS BLD VENIPUNCTURE: CPT

## 2024-02-02 PROCEDURE — 83036 HEMOGLOBIN GLYCOSYLATED A1C: CPT

## 2024-04-11 DIAGNOSIS — I10 PRIMARY HYPERTENSION: ICD-10-CM

## 2024-04-12 NOTE — TELEPHONE ENCOUNTER
Please review. Protocol Failed; No Protocol    No Active/ Future labs pended     Requested Prescriptions   Pending Prescriptions Disp Refills    OLMESARTAN MEDOXOMIL-HCTZ 20-12.5 MG Oral Tab [Pharmacy Med Name: OLMESARTAN-HCTZ 20-12.5 MG TAB] 90 tablet 2     Sig: TAKE 1 TABLET BY MOUTH EVERY DAY       Hypertension Medications Protocol Failed - 4/11/2024  4:28 PM        Failed - CMP or BMP in past 12 months        Failed - EGFRCR or GFRNAA > 50     GFR Evaluation            Passed - Last BP reading less than 140/90     BP Readings from Last 1 Encounters:   12/30/23 139/68               Passed - In person appointment or virtual visit in the past 12 mos or appointment in next 3 mos     Recent Outpatient Visits              3 months ago Type 2 diabetes mellitus without complication, without long-term current use of insulin ([facility])    [facility] [address], [place]    Office Visit    9 months ago Type 2 diabetes mellitus without complication, without long-term current use of insulin ([facility])    [facility] [address], [place]    Office Visit    11 months ago     [facility] [place]    Nurse Only    1 year ago Type 2 diabetes mellitus without complication, without long-term current use of insulin ([facility])    [facility] [address], MD    Office Visit    1 year ago Type 2 diabetes mellitus without complication, without long-term current use of insulin ([facility])    [facility] [address], [place]    Office Visit                               Recent Outpatient Visits              3 months ago Type 2 diabetes mellitus without complication, without long-term current use of insulin ([facility])    [facility] [address], [place]    Office Visit    9 months ago Type 2 diabetes mellitus without  complication, without long-term current use of insulin (H[facility])    [facility] [address], [facility]    Office Visit    11 months ago     [facility]    Nurse Only    1 year ago Type 2 diabetes mellitus without complication, without long-term current use of insulin (H[facility])    [facility] [address], [place]    Office Visit    1 year ago Type 2 diabetes mellitus without complication, without long-term current use of insulin (H[facility])    [facility] [address], [place]    Office Visit

## 2024-04-13 RX ORDER — OLMESARTAN MEDOXOMIL AND HYDROCHLOROTHIAZIDE 20/12.5 20; 12.5 MG/1; MG/1
1 TABLET ORAL DAILY
Qty: 90 TABLET | Refills: 3 | Status: SHIPPED | OUTPATIENT
Start: 2024-04-13

## 2024-07-05 ENCOUNTER — LAB ENCOUNTER (OUTPATIENT)
Dept: LAB | Age: 54
End: 2024-07-05
Attending: FAMILY MEDICINE
Payer: COMMERCIAL

## 2024-07-05 ENCOUNTER — OFFICE VISIT (OUTPATIENT)
Dept: FAMILY MEDICINE CLINIC | Facility: CLINIC | Age: 54
End: 2024-07-05
Payer: COMMERCIAL

## 2024-07-05 VITALS
HEIGHT: 72 IN | RESPIRATION RATE: 17 BRPM | HEART RATE: 76 BPM | WEIGHT: 271 LBS | OXYGEN SATURATION: 98 % | DIASTOLIC BLOOD PRESSURE: 82 MMHG | SYSTOLIC BLOOD PRESSURE: 130 MMHG | BODY MASS INDEX: 36.7 KG/M2

## 2024-07-05 DIAGNOSIS — Z12.11 SCREEN FOR COLON CANCER: ICD-10-CM

## 2024-07-05 DIAGNOSIS — E11.9 TYPE 2 DIABETES MELLITUS WITHOUT COMPLICATION, WITHOUT LONG-TERM CURRENT USE OF INSULIN (HCC): ICD-10-CM

## 2024-07-05 DIAGNOSIS — E11.9 TYPE 2 DIABETES MELLITUS WITHOUT COMPLICATION, WITHOUT LONG-TERM CURRENT USE OF INSULIN (HCC): Primary | ICD-10-CM

## 2024-07-05 DIAGNOSIS — I10 PRIMARY HYPERTENSION: ICD-10-CM

## 2024-07-05 LAB
ALBUMIN SERPL-MCNC: 5.1 G/DL (ref 3.2–4.8)
ALBUMIN/GLOB SERPL: 1.8 {RATIO} (ref 1–2)
ALP LIVER SERPL-CCNC: 46 U/L
ALT SERPL-CCNC: 66 U/L
ANION GAP SERPL CALC-SCNC: 10 MMOL/L (ref 0–18)
AST SERPL-CCNC: 44 U/L (ref ?–34)
BILIRUB SERPL-MCNC: 0.9 MG/DL (ref 0.3–1.2)
BUN BLD-MCNC: 11 MG/DL (ref 9–23)
BUN/CREAT SERPL: 12.8 (ref 10–20)
CALCIUM BLD-MCNC: 9.6 MG/DL (ref 8.7–10.4)
CARTRIDGE LOT#: ABNORMAL NUMERIC
CHLORIDE SERPL-SCNC: 107 MMOL/L (ref 98–112)
CO2 SERPL-SCNC: 24 MMOL/L (ref 21–32)
CREAT BLD-MCNC: 0.86 MG/DL
CREAT UR-SCNC: 66.3 MG/DL
EGFRCR SERPLBLD CKD-EPI 2021: 104 ML/MIN/1.73M2 (ref 60–?)
FASTING STATUS PATIENT QL REPORTED: YES
GLOBULIN PLAS-MCNC: 2.9 G/DL (ref 2–3.5)
GLUCOSE BLD-MCNC: 133 MG/DL (ref 70–99)
HEMOGLOBIN A1C: 6.1 % (ref 4.3–5.6)
MICROALBUMIN UR-MCNC: 1.3 MG/DL
MICROALBUMIN/CREAT 24H UR-RTO: 19.6 UG/MG (ref ?–30)
OSMOLALITY SERPL CALC.SUM OF ELEC: 293 MOSM/KG (ref 275–295)
POTASSIUM SERPL-SCNC: 4 MMOL/L (ref 3.5–5.1)
PROT SERPL-MCNC: 8 G/DL (ref 5.7–8.2)
SODIUM SERPL-SCNC: 141 MMOL/L (ref 136–145)

## 2024-07-05 PROCEDURE — 3008F BODY MASS INDEX DOCD: CPT | Performed by: FAMILY MEDICINE

## 2024-07-05 PROCEDURE — 83036 HEMOGLOBIN GLYCOSYLATED A1C: CPT | Performed by: FAMILY MEDICINE

## 2024-07-05 PROCEDURE — 82043 UR ALBUMIN QUANTITATIVE: CPT

## 2024-07-05 PROCEDURE — 90471 IMMUNIZATION ADMIN: CPT | Performed by: FAMILY MEDICINE

## 2024-07-05 PROCEDURE — 80053 COMPREHEN METABOLIC PANEL: CPT

## 2024-07-05 PROCEDURE — 3044F HG A1C LEVEL LT 7.0%: CPT | Performed by: FAMILY MEDICINE

## 2024-07-05 PROCEDURE — 99213 OFFICE O/P EST LOW 20 MIN: CPT | Performed by: FAMILY MEDICINE

## 2024-07-05 PROCEDURE — 82570 ASSAY OF URINE CREATININE: CPT

## 2024-07-05 PROCEDURE — 3079F DIAST BP 80-89 MM HG: CPT | Performed by: FAMILY MEDICINE

## 2024-07-05 PROCEDURE — 3075F SYST BP GE 130 - 139MM HG: CPT | Performed by: FAMILY MEDICINE

## 2024-07-05 PROCEDURE — 90677 PCV20 VACCINE IM: CPT | Performed by: FAMILY MEDICINE

## 2024-07-05 PROCEDURE — 36415 COLL VENOUS BLD VENIPUNCTURE: CPT

## 2024-07-05 RX ORDER — AMLODIPINE BESYLATE 5 MG/1
5 TABLET ORAL DAILY
Qty: 90 TABLET | Refills: 3 | Status: SHIPPED | OUTPATIENT
Start: 2024-07-05

## 2024-07-05 NOTE — PROGRESS NOTES
7/5/2024  9:40 AM    Mando Pisano is a 54 year old male.    Chief complaint(s):   Chief Complaint   Patient presents with    Follow - Up     Evaluation for type 2 diabetes     HPI:     Mando Pisano primary complaint is regarding DM, HTN.     Patient Mando Pisano is a 54 year old male is here to be evaluated for type 2 diabetes.  Specifically, male has type 2, insulin none requiring diabetes. Compliance with treatment has been fair.  Patient's diabetes was first diagnosed  Oct 2022 years ago.  Patient follows a 2000 calorie ADA diet.  Patient report experiencing the following diabetes related symptoms; Negative for polyuria, Negative for polydipsia, Negative for blurred vision.  Depression symptoms include none.  Tobacco screen: none  smoker.  Current meds include :  oral hypoglycemic include: Metformin 500 mg  insulin/injectable : - .  Hypoglycemia severity is not applicable.he reports home blood glucose readings have been ? (#s) and believes  having fair glucose control.  Most recent lab results include glycohemoglobin 6.1 %, microalbuminuria have been ?.  In regard to preventative care, his last ophthalmology exam was in 2-3 weeks ago.  Opthalmic evaluation have shown - pathology.  Concurrent relative health problems include HTN and hyperlipidemia.      Mando Pisanois a 54 year old male presents with hypertension.  This was first diagnosed more than 7 years ago.  Current nonpharmacologic treatment includes low sodium diet, exercise, and meditation.  His current cardiac medication(s) regimen includes: Amlodipine 5 mg, Olmesartan- hydrochlorothiazide 20-12.5 mg .  He has not kept a blood pressure diary, but states that his blood pressures have been fair controll.  He is tolerating his medication(s)  well without side effects.  Compliance with treatment has been fair.       HISTORY:  Past Medical History:    Essential hypertension    GERD (gastroesophageal reflux disease)    Mixed hyperlipidemia      Past Surgical History:    Procedure Laterality Date    Colonoscopy        Family History   Problem Relation Age of Onset    Diabetes Mother     Hypertension Mother     Other (Other) Mother         CVA    Hypertension Brother     Cancer Neg     Heart Disorder Neg       Social History:   Social History     Socioeconomic History    Marital status:    Tobacco Use    Smoking status: Never     Passive exposure: Never    Smokeless tobacco: Never   Vaping Use    Vaping status: Never Used   Substance and Sexual Activity    Alcohol use: Yes     Comment: occ    Drug use: No        Immunizations:   Immunization History   Administered Date(s) Administered    Covid-19 Vaccine Pfizer 30 mcg/0.3 ml 04/10/2021, 04/27/2021    Influenza 09/06/2013, 10/17/2022    Pneumococcal Conjugate PCV20 07/05/2024    TDAP 04/29/2022    Zoster Vaccine Recombinant Adjuvanted (Shingrix) 04/15/2022, 07/02/2022   Deferred Date(s) Deferred    Influenza 09/06/2013, 01/08/2016, 10/14/2016       Medications (Active prior to today's visit):  Current Outpatient Medications   Medication Sig Dispense Refill    amLODIPine 5 MG Oral Tab Take 1 tablet (5 mg total) by mouth daily. 90 tablet 3    Olmesartan Medoxomil-HCTZ 20-12.5 MG Oral Tab Take 1 tablet by mouth daily. 90 tablet 3    metFORMIN 500 MG Oral Tab Take 1 tablet (500 mg total) by mouth daily with breakfast. 90 tablet 2    atorvastatin 20 MG Oral Tab Take 1 tablet (20 mg total) by mouth daily. 90 tablet 3    Lancets Does not apply Misc Test once daily (Patient not taking: Reported on 12/30/2023) 100 each 3       Allergies:  No Known Allergies      ROS:   Review of Systems   Constitutional:  Negative for appetite change, fatigue and fever.   Eyes:  Negative for visual disturbance.   Respiratory:  Negative for shortness of breath.    Cardiovascular:  Negative for chest pain.   Gastrointestinal:  Negative for abdominal pain.   Endocrine: Negative for polydipsia and polyuria.   Musculoskeletal:  Negative for myalgias.    Skin:  Negative for rash.   Neurological:  Negative for dizziness, weakness and headaches.       PHYSICAL EXAM:   VS: /82 (BP Location: Right arm, Patient Position: Sitting, Cuff Size: large)   Pulse 76   Resp 17   Ht 6' (1.829 m)   Wt 271 lb (122.9 kg)   SpO2 98%   BMI 36.75 kg/m²     Physical Exam  Vitals reviewed.   Constitutional:       Appearance: Normal appearance. He is well-developed.   HENT:      Head: Normocephalic.   Eyes:      General: No scleral icterus.     Conjunctiva/sclera: Conjunctivae normal.   Cardiovascular:      Rate and Rhythm: Normal rate and regular rhythm.      Heart sounds: Normal heart sounds.   Pulmonary:      Effort: Pulmonary effort is normal.      Breath sounds: Normal breath sounds.   Musculoskeletal:      Cervical back: Neck supple.   Feet:      Right foot:      Protective Sensation: 10 sites tested.  10 sites sensed.      Left foot:      Protective Sensation: 10 sites tested.  10 sites sensed.   Skin:     Findings: No rash.   Psychiatric:         Mood and Affect: Mood normal.         LABORATORY RESULTS:   No results found for: \"URCOLOR\", \"URCLA\", \"URINELEUK\", \"URINENITRITE\", \"URINEBLOOD\"   Results for orders placed or performed in visit on 07/05/24   POC Glycohemoglobin [70237]   Result Value Ref Range    HEMOGLOBIN A1C 6.1 (A) 4.3 - 5.6 %    Cartridge Lot# [id number] Numeric    Cartridge Expiration Date 12/31/2024 Date     EKG / Spirometry : -     Radiology: No results found.     ASSESSMENT/PLAN:   Assessment   Encounter Diagnoses   Name Primary?    Type 2 diabetes mellitus without complication, without long-term current use of insulin (HCC) Yes    Primary hypertension     Screen for colon cancer      1. Type 2 diabetes mellitus without complication, without long-term current use of insulin (HCC)    DIABETES A&P    LABORATORY & ORDERS: Blood test(s) ordered today ;   Orders Placed This Encounter   Procedures    POC Glycohemoglobin [09156]    Microalb/Creat Ratio, Random  Urine [E]    Comp Metabolic Panel (14) [E]    Prevnar 20 (PCV20) [60868]    Diabetic Retinopathy Exam  OU - Both Eyes     Additional orders include:   MEDICATIONS:    amLODIPine 5 MG Oral Tab, Take 1 tablet (5 mg total) by mouth daily., Disp: 90 tablet, Rfl: 3    Olmesartan Medoxomil-HCTZ 20-12.5 MG Oral Tab, Take 1 tablet by mouth daily., Disp: 90 tablet, Rfl: 3    metFORMIN 500 MG Oral Tab, Take 1 tablet (500 mg total) by mouth daily with breakfast., Disp: 90 tablet, Rfl: 2    atorvastatin 20 MG Oral Tab, Take 1 tablet (20 mg total) by mouth daily., Disp: 90 tablet, Rfl: 3    Lancets Does not apply Misc, Test once daily (Patient not taking: Reported on 12/30/2023), Disp: 100 each, Rfl: 3.  Requested Prescriptions     Signed Prescriptions Disp Refills    amLODIPine 5 MG Oral Tab 90 tablet 3     Sig: Take 1 tablet (5 mg total) by mouth daily.      REFERRALS:       Procedures    POC Glycohemoglobin [67490]    Microalb/Creat Ratio, Random Urine [E]    Comp Metabolic Panel (14) [E]        OPHTHALMOLOGY - EXTERNAL     RECOMMENDATIONS: instructed in use of glucometer ( check fasting glucose daily), return for training in administering insulin injections, adherence to an 1800 calorie ADA diet,   a graduated exercise program, HgbA1C level checked quarterly, daily foot self-inspection, need for yearly flu shots, and avoid all sodas, juices, candy, chocolates, cakes, ice cream, etc.      FOLLOW-UP: Schedule a follow-up visit in 6 months.          2. Primary hypertension     MEDICATIONS: CPM     LABORATORY & ORDERS:   Orders Placed This Encounter   Procedures    POC Glycohemoglobin [63649]    Microalb/Creat Ratio, Random Urine [E]    Comp Metabolic Panel (14) [E]    Prevnar 20 (PCV20) [90430]    Diabetic Retinopathy Exam  OU - Both Eyes     RECOMMENDATIONS given include: avoid pseudoephedrine or other stimulants/decongestants in common cold remedies, decrease consumption of alcohol, perform routine monitoring of blood  pressure with home blood pressure cuff, exercise, reduction of dietary salt intake, take medication as prescribed, try not to miss doses, smoking cessation, weight loss, and stress reduction.    FOLLOW-UP: Schedule a follow-up visit in 6 months.           3. Screen for colon cancer    Referral:  Gastro Referral - In Network              Orders This Visit:  Orders Placed This Encounter   Procedures    POC Glycohemoglobin [54433]    Microalb/Creat Ratio, Random Urine [E]    Comp Metabolic Panel (14) [E]    Prevnar 20 (PCV20) [39733]    Diabetic Retinopathy Exam  OU - Both Eyes       Meds This Visit:  Requested Prescriptions     Signed Prescriptions Disp Refills    amLODIPine 5 MG Oral Tab 90 tablet 3     Sig: Take 1 tablet (5 mg total) by mouth daily.       Imaging & Referrals:  GASTRO - INTERNAL  OPHTHALMOLOGY - EXTERNAL  PCV20 VACCINE FOR INTRAMUSCULAR USE         LISA JACOB MD

## 2024-10-02 NOTE — TELEPHONE ENCOUNTER
Refill passed per [facility] protocol.  Requested Prescriptions   Pending Prescriptions Disp Refills    METFORMIN 500 MG Oral Tab [Pharmacy Med Name: METFORMIN  MG TABLET] 90 tablet 2     Sig: TAKE 1 TABLET BY MOUTH EVERY DAY WITH BREAKFAST       Diabetes Medication Protocol Passed - 9/30/2024 12:02 AM        Passed - Last A1C < 7.5 and within past 6 months     Lab Results   Component Value Date    A1C 6.1 (A) 07/05/2024             Passed - In person appointment or virtual visit in the past 6 mos or appointment in next 3 mos     Recent Outpatient Visits              2 months ago Type 2 diabetes mellitus without complication, without long-term current use of insulin ([facility])    [facility]    Office Visit    9 months ago Type 2 diabetes mellitus without complication, without long-term current use of insulin ([facility]    [facility] Visit    1 year ago Type 2 diabetes mellitus without complication, without long-term current use of insulin ([facility]    [facility] Visit    1 year ago     [facility] [place]    Nurse Only    1 year ago Type 2 diabetes mellitus without complication, without long-term current use of insulin ([facility])    [facility]    Office Visit                      Passed - Microalbumin procedure in past 12 months or taking ACE/ARB        Passed - EGFRCR or GFRNAA > 50     GFR Evaluation  EGFRCR: 104 , resulted on 7/5/2024          Passed - GFR in the past 12 months           Recent Outpatient Visits              2 months ago Type 2 diabetes mellitus without complication, without long-term current use of insulin ([facility])    [facility]    Office Visit     9 months ago Type 2 diabetes mellitus without complication, without long-term current use of insulin [facility]    1 year ago Type 2 diabetes mellitus without complication, without long-term current use of insulin [facility]    1 year ago     [facility]    Nurse Only    1 year ago Type 2 diabetes mellitus without complication, without long-term current use of insulin ([facility]

## 2024-10-09 ENCOUNTER — MED REC SCAN ONLY (OUTPATIENT)
Dept: FAMILY MEDICINE CLINIC | Facility: CLINIC | Age: 54
End: 2024-10-09

## 2024-11-26 ENCOUNTER — LAB ENCOUNTER (OUTPATIENT)
Dept: LAB | Age: 54
End: 2024-11-26
Attending: FAMILY MEDICINE
Payer: COMMERCIAL

## 2024-11-26 ENCOUNTER — OFFICE VISIT (OUTPATIENT)
Dept: FAMILY MEDICINE CLINIC | Facility: CLINIC | Age: 54
End: 2024-11-26
Payer: COMMERCIAL

## 2024-11-26 VITALS
WEIGHT: 270 LBS | HEIGHT: 72 IN | SYSTOLIC BLOOD PRESSURE: 160 MMHG | BODY MASS INDEX: 36.57 KG/M2 | HEART RATE: 69 BPM | DIASTOLIC BLOOD PRESSURE: 80 MMHG

## 2024-11-26 DIAGNOSIS — M19.90 ARTHRITIS: ICD-10-CM

## 2024-11-26 DIAGNOSIS — Z00.00 PHYSICAL EXAM: ICD-10-CM

## 2024-11-26 DIAGNOSIS — Z00.00 PHYSICAL EXAM: Primary | ICD-10-CM

## 2024-11-26 LAB
ALBUMIN SERPL-MCNC: 5.2 G/DL (ref 3.2–4.8)
ALBUMIN/GLOB SERPL: 1.9 {RATIO} (ref 1–2)
ALP LIVER SERPL-CCNC: 47 U/L
ALT SERPL-CCNC: 56 U/L
ANION GAP SERPL CALC-SCNC: 9 MMOL/L (ref 0–18)
AST SERPL-CCNC: 48 U/L (ref ?–34)
BASOPHILS # BLD AUTO: 0.1 X10(3) UL (ref 0–0.2)
BASOPHILS NFR BLD AUTO: 1.1 %
BILIRUB SERPL-MCNC: 0.7 MG/DL (ref 0.3–1.2)
BILIRUB UR QL: NEGATIVE
BUN BLD-MCNC: 10 MG/DL (ref 9–23)
BUN/CREAT SERPL: 16.1 (ref 10–20)
CALCIUM BLD-MCNC: 10.6 MG/DL (ref 8.7–10.4)
CHLORIDE SERPL-SCNC: 103 MMOL/L (ref 98–112)
CHOLEST SERPL-MCNC: 203 MG/DL (ref ?–200)
CLARITY UR: CLEAR
CO2 SERPL-SCNC: 26 MMOL/L (ref 21–32)
COLOR UR: COLORLESS
CREAT BLD-MCNC: 0.62 MG/DL
DEPRECATED RDW RBC AUTO: 39.9 FL (ref 35.1–46.3)
EGFRCR SERPLBLD CKD-EPI 2021: 115 ML/MIN/1.73M2 (ref 60–?)
EOSINOPHIL # BLD AUTO: 0.7 X10(3) UL (ref 0–0.7)
EOSINOPHIL NFR BLD AUTO: 7.8 %
ERYTHROCYTE [DISTWIDTH] IN BLOOD BY AUTOMATED COUNT: 12.6 % (ref 11–15)
EST. AVERAGE GLUCOSE BLD GHB EST-MCNC: 143 MG/DL (ref 68–126)
FASTING PATIENT LIPID ANSWER: YES
FASTING STATUS PATIENT QL REPORTED: YES
GLOBULIN PLAS-MCNC: 2.7 G/DL (ref 2–3.5)
GLUCOSE BLD-MCNC: 143 MG/DL (ref 70–99)
GLUCOSE UR-MCNC: NORMAL MG/DL
HBA1C MFR BLD: 6.6 % (ref ?–5.7)
HCT VFR BLD AUTO: 44 %
HDLC SERPL-MCNC: 54 MG/DL (ref 40–59)
HGB BLD-MCNC: 14.9 G/DL
HGB UR QL STRIP.AUTO: NEGATIVE
IMM GRANULOCYTES # BLD AUTO: 0.05 X10(3) UL (ref 0–1)
IMM GRANULOCYTES NFR BLD: 0.6 %
KETONES UR-MCNC: NEGATIVE MG/DL
LDLC SERPL CALC-MCNC: 130 MG/DL (ref ?–100)
LEUKOCYTE ESTERASE UR QL STRIP.AUTO: NEGATIVE
LYMPHOCYTES # BLD AUTO: 2.51 X10(3) UL (ref 1–4)
LYMPHOCYTES NFR BLD AUTO: 27.9 %
MCH RBC QN AUTO: 29.4 PG (ref 26–34)
MCHC RBC AUTO-ENTMCNC: 33.9 G/DL (ref 31–37)
MCV RBC AUTO: 86.8 FL
MONOCYTES # BLD AUTO: 0.71 X10(3) UL (ref 0.1–1)
MONOCYTES NFR BLD AUTO: 7.9 %
NEUTROPHILS # BLD AUTO: 4.92 X10 (3) UL (ref 1.5–7.7)
NEUTROPHILS # BLD AUTO: 4.92 X10(3) UL (ref 1.5–7.7)
NEUTROPHILS NFR BLD AUTO: 54.7 %
NITRITE UR QL STRIP.AUTO: NEGATIVE
NONHDLC SERPL-MCNC: 149 MG/DL (ref ?–130)
OSMOLALITY SERPL CALC.SUM OF ELEC: 288 MOSM/KG (ref 275–295)
PH UR: 7.5 [PH] (ref 5–8)
PLATELET # BLD AUTO: 327 10(3)UL (ref 150–450)
POTASSIUM SERPL-SCNC: 4 MMOL/L (ref 3.5–5.1)
PROT SERPL-MCNC: 7.9 G/DL (ref 5.7–8.2)
PROT UR-MCNC: NEGATIVE MG/DL
RBC # BLD AUTO: 5.07 X10(6)UL
SODIUM SERPL-SCNC: 138 MMOL/L (ref 136–145)
SP GR UR STRIP: 1.01 (ref 1–1.03)
TRIGL SERPL-MCNC: 105 MG/DL (ref 30–149)
TSI SER-ACNC: 1.74 UIU/ML (ref 0.55–4.78)
URATE SERPL-MCNC: 6.7 MG/DL
UROBILINOGEN UR STRIP-ACNC: NORMAL
VIT D+METAB SERPL-MCNC: 52.7 NG/ML (ref 30–100)
VLDLC SERPL CALC-MCNC: 19 MG/DL (ref 0–30)
WBC # BLD AUTO: 9 X10(3) UL (ref 4–11)

## 2024-11-26 PROCEDURE — 3008F BODY MASS INDEX DOCD: CPT | Performed by: FAMILY MEDICINE

## 2024-11-26 PROCEDURE — 80053 COMPREHEN METABOLIC PANEL: CPT

## 2024-11-26 PROCEDURE — 84550 ASSAY OF BLOOD/URIC ACID: CPT

## 2024-11-26 PROCEDURE — 84153 ASSAY OF PSA TOTAL: CPT

## 2024-11-26 PROCEDURE — 3077F SYST BP >= 140 MM HG: CPT | Performed by: FAMILY MEDICINE

## 2024-11-26 PROCEDURE — 81003 URINALYSIS AUTO W/O SCOPE: CPT

## 2024-11-26 PROCEDURE — 82306 VITAMIN D 25 HYDROXY: CPT

## 2024-11-26 PROCEDURE — 83036 HEMOGLOBIN GLYCOSYLATED A1C: CPT

## 2024-11-26 PROCEDURE — 3061F NEG MICROALBUMINURIA REV: CPT | Performed by: FAMILY MEDICINE

## 2024-11-26 PROCEDURE — 80061 LIPID PANEL: CPT

## 2024-11-26 PROCEDURE — 36415 COLL VENOUS BLD VENIPUNCTURE: CPT

## 2024-11-26 PROCEDURE — 99396 PREV VISIT EST AGE 40-64: CPT | Performed by: FAMILY MEDICINE

## 2024-11-26 PROCEDURE — 84154 ASSAY OF PSA FREE: CPT

## 2024-11-26 PROCEDURE — 84443 ASSAY THYROID STIM HORMONE: CPT

## 2024-11-26 PROCEDURE — 85025 COMPLETE CBC W/AUTO DIFF WBC: CPT

## 2024-11-26 PROCEDURE — 3079F DIAST BP 80-89 MM HG: CPT | Performed by: FAMILY MEDICINE

## 2024-11-26 RX ORDER — ATORVASTATIN CALCIUM 10 MG/1
10 TABLET, FILM COATED ORAL NIGHTLY
Qty: 90 TABLET | Refills: 3 | Status: SHIPPED | OUTPATIENT
Start: 2024-11-26

## 2024-11-26 NOTE — PROGRESS NOTES
11/26/2024  9:58 AM    Mando Pisano is a 54 year old male.    Chief complaint(s):   Chief Complaint   Patient presents with    Routine Physical     HPI:     Mando Pisaon primary complaint is regarding CPE.       Mando Pisano is a 54 year old male is here for routine periodic health screening and examination.  His last physical exam was 1 years ago.  His last ECG was ,many yrs. His last diabetes screening test was 1 years ago and was abnormal: Hx diabetes.   His last cholesterol test was 1 year ago and was abnormal: Hx hyperlipidemia .  Last dentist visit was 1months ago.  He is current with his Td immunization, 2022.  He is not current with his Flu vaccination and is interested in receiving it today.  Patient last colonoscopy was none . He is not a smoker.        HISTORY:  Past Medical History:    Essential hypertension    GERD (gastroesophageal reflux disease)    Mixed hyperlipidemia      Past Surgical History:   Procedure Laterality Date    Colonoscopy        Family History   Problem Relation Age of Onset    Diabetes Mother     Hypertension Mother     Other (Other) Mother         CVA    Hypertension Brother     Cancer Neg     Heart Disorder Neg       Social History:   Social History     Socioeconomic History    Marital status:    Tobacco Use    Smoking status: Never     Passive exposure: Never    Smokeless tobacco: Never   Vaping Use    Vaping status: Never Used   Substance and Sexual Activity    Alcohol use: Yes     Comment: occ    Drug use: No        Immunizations:   Immunization History   Administered Date(s) Administered    Covid-19 Vaccine Pfizer 30 mcg/0.3 ml 04/10/2021, 04/27/2021    Influenza 09/06/2013, 10/17/2022    Pneumococcal Conjugate PCV20 07/05/2024    TDAP 04/29/2022    Zoster Vaccine Recombinant Adjuvanted (Shingrix) 04/15/2022, 07/02/2022   Pended Date(s) Pended    Influenza Vaccine, trivalent (IIV3), PF 0.5mL (26729) 11/26/2024   Deferred Date(s) Deferred    Influenza 09/06/2013, 01/08/2016,  10/14/2016       Medications (Active prior to today's visit):  Current Outpatient Medications   Medication Sig Dispense Refill    metFORMIN 500 MG Oral Tab Take 1 tablet (500 mg total) by mouth daily with breakfast. 90 tablet 3    amLODIPine 5 MG Oral Tab Take 1 tablet (5 mg total) by mouth daily. 90 tablet 3    Olmesartan Medoxomil-HCTZ 20-12.5 MG Oral Tab Take 1 tablet by mouth daily. 90 tablet 3    atorvastatin 20 MG Oral Tab Take 1 tablet (20 mg total) by mouth daily. 90 tablet 3    Lancets Does not apply Misc Test once daily 100 each 3       Allergies:  Allergies[1]      ROS:   Review of Systems   Constitutional:  Negative for appetite change, fatigue and fever.   HENT:  Negative for hearing loss and nosebleeds.    Eyes:  Negative for pain and visual disturbance.   Respiratory:  Negative for apnea and shortness of breath.    Cardiovascular:  Negative for chest pain, palpitations and leg swelling.   Gastrointestinal:  Negative for abdominal pain, blood in stool, constipation, diarrhea, nausea and vomiting.   Endocrine: Negative for polydipsia and polyuria.   Genitourinary:  Negative for decreased urine volume, frequency and hematuria.        No nocturia   Musculoskeletal:  Positive for arthralgias (right foot arthritis).   Skin:  Negative for rash.   Neurological:  Negative for dizziness, syncope and headaches.   Psychiatric/Behavioral:  Negative for dysphoric mood and sleep disturbance.        PHYSICAL EXAM:   VS: /80   Pulse 69   Ht 6' (1.829 m)   Wt 270 lb (122.5 kg)   BMI 36.62 kg/m²     Physical Exam  Vitals reviewed.   Constitutional:       Appearance: Normal appearance.      Comments:      HENT:      Head: Normocephalic.      Right Ear: Hearing, tympanic membrane and ear canal normal.      Left Ear: Hearing, tympanic membrane and ear canal normal.      Nose: Nose normal. No rhinorrhea.      Mouth/Throat:      Mouth: Mucous membranes are moist.      Pharynx: Oropharynx is clear.   Eyes:       Extraocular Movements: Extraocular movements intact.      Conjunctiva/sclera: Conjunctivae normal.      Pupils: Pupils are equal, round, and reactive to light.   Neck:      Thyroid: No thyroid mass.      Vascular: No carotid bruit.   Cardiovascular:      Rate and Rhythm: Normal rate and regular rhythm.      Heart sounds: Normal heart sounds, S1 normal and S2 normal. No murmur heard.  Pulmonary:      Effort: Pulmonary effort is normal.      Breath sounds: Normal breath sounds.   Abdominal:      General: Abdomen is flat. Bowel sounds are normal.      Palpations: Abdomen is soft. There is no hepatomegaly, splenomegaly or mass.      Tenderness: There is no abdominal tenderness.      Hernia: No hernia is present.   Musculoskeletal:      Cervical back: Neck supple.      Comments: Spinal exam without scoliosis.      Lymphadenopathy:      Cervical: No cervical adenopathy.   Skin:     General: Skin is warm.      Findings: No rash.   Neurological:      General: No focal deficit present.      Mental Status: He is alert.      Deep Tendon Reflexes:      Reflex Scores:       Patellar reflexes are 2+ on the right side and 2+ on the left side.  Psychiatric:         Attention and Perception: Attention normal.         Mood and Affect: Mood and affect normal.         LABORATORY RESULTS:     EKG / Spirometry : -     Radiology: No results found.     ASSESSMENT/PLAN:   Assessment   Encounter Diagnoses   Name Primary?    Physical exam Yes    Arthritis          CPE PLAN:    LABS / TEST & ORDERS for today's visit :Blood test(s) ordered today for send out to [facility] lab:  Orders Placed This Encounter   Procedures    CBC With Differential With Platelet    Comp Metabolic Panel (14)    Hemoglobin A1C    Lipid Panel    PSA, Total W Reflex To Free    TSH W Reflex To Free T4    Vitamin D    Urinalysis with Culture Reflex    Uric Acid    INFLUENZA VACCINE, TRI, PRESERV FREE, 0.5 ML     Referrals: INFLUENZA VACCINE, TRI, PRESERV FREE, 0.5  ML  EKG 12-LEAD  In-House; Urine dip.  Test/Procedures done today include: EKG.    IMMUNIZATIONS: Flu, given today.    RECOMMENDATIONS given include: ANTICIPATORY GUIDANCE  topics covered today include: safety (i.e. seat belts, helmets, sunscreen, protective sports gear ), nutrition (i.e. healthy meals and snacks (i.e. avoid junk food and high-carbohydrate foods); athletic conditioning, fluids; low fat milk, limit to less than 20 oz. a day; dental care ), and Healthy habits& Social competence & Responsibilities: Recommendations on physical activity; exercise daily or at least 3 times a week for 30-60 minutes doing cardiovascular exercise. Encourage to maintain the best physical and dental hygiene possible.  Consider a  if overweight and/or having difficult in staying active; attempt to keep a schedule that includes an adequate sleep and physical exercise / activities Patient educated on doing regular self testicular exam. Patient was educated on sexual transmitted disease. Best to abstain from sexual intercourse until he is ready to form a family. Use of condoms may prevent transmission of infections as well as pregnancy.    FOLLOW-UP: Schedule a follow-up visit in 3 weeks for recheck BP.          Orders This Visit:  Orders Placed This Encounter   Procedures    CBC With Differential With Platelet    Comp Metabolic Panel (14)    Hemoglobin A1C    Lipid Panel    PSA, Total W Reflex To Free    TSH W Reflex To Free T4    Vitamin D    Urinalysis with Culture Reflex    Uric Acid    INFLUENZA VACCINE, TRI, PRESERV FREE, 0.5 ML       Meds This Visit:  Requested Prescriptions      No prescriptions requested or ordered in this encounter       Imaging & Referrals:  INFLUENZA VACCINE, TRI, PRESERV FREE, 0.5 ML  EKG 12-LEAD         LISA JACOB MD       [1] No Known Allergies

## 2024-11-27 LAB
PROSTATE SPECIFIC AG: 1.7 NG/ML
PROSTATE SPECIFIC AG: 1.7 NG/ML

## 2024-12-09 DIAGNOSIS — E78.00 PURE HYPERCHOLESTEROLEMIA: ICD-10-CM

## 2024-12-13 NOTE — TELEPHONE ENCOUNTER
Please review. Protocol Failed; No Protocol    Future Appointments   Date Time Provider Department Center   12/20/2024  9:00 AM Abraham Whiting MD [facility]     Please advise Atorvastatin 10 mg written on 11/28/2024 for year supply. Patient has Atorvastatin 20 mg active as well.    Please advise which strength patient should be taking.     Requested Prescriptions   Pending Prescriptions Disp Refills    atorvastatin 20 MG Oral Tab [Pharmacy Med Name: ATORVASTATIN 20 MG TABLET] 90 tablet 3     Sig: Take 1 tablet (20 mg total) by mouth daily.       Cholesterol Medication Protocol Passed - 12/13/2024  3:54 PM        Passed - ALT < 80     Lab Results   Component Value Date    ALT 56 (H) 11/26/2024             Passed - ALT resulted within past year        Passed - Lipid panel within past 12 months     Lab Results   Component Value Date    CHOLEST 203 (H) 11/26/2024    TRIG 105 11/26/2024    HDL 54 11/26/2024     (H) 11/26/2024    VLDL 19 11/26/2024    NONHDLC 149 (H) 11/26/2024             Passed - In person appointment or virtual visit in the past 12 mos or appointment in next 3 mos     Recent Outpatient Visits              2 weeks ago Physical exam    [facility], Abraham Dias MD    Office Visit    5 months ago Type 2 diabetes mellitus without complication, without long-term current use of insulin (HCC)    [facility]    [facility] Visit    11 months ago Type 2 diabetes mellitus without complication, without long-term current use of insulin ([facility]    Office Visit    1 year ago Type 2 diabetes mellitus without complication, without long-term current use of insulin (HCC)    [facility]    Office Visit    1 year ago     [facility]     Nurse Only          Future Appointments         Provider Department Appt Notes    In 1 week Abraham Whiting MD [facility] 3wk follow up                           Future Appointments         Provider Department Appt Notes    In 1 week Abraham Whiting MD [facility] 3wk follow up          Recent Outpatient Visits              2 weeks ago Physical exam    [facility], Abraham Dias MD    Office Visit    5 months ago Type 2 diabetes mellitus without complication, without long-term current use of insulin (HCC)    [facility], Abraham Dias MD    Office Visit    11 months ago Type 2 diabetes mellitus without complication, without long-term current use of insulin (HCC)    [facility]    [facility]    1 year ago Type 2 diabetes mellitus without complication, without long-term current use of insulin ([facility]CC[facility]    [facility]    1 year ago     [facility]    Nurse Only

## 2024-12-13 NOTE — TELEPHONE ENCOUNTER
REFILL PASSED PER [facility] PROTOCOLS    Requested Prescriptions   Pending Prescriptions Disp Refills    ATORVASTATIN 20 MG Oral Tab [Pharmacy Med Name: ATORVASTATIN 20 MG TABLET] 90 tablet 3     Sig: TAKE 1 TABLET BY MOUTH EVERY DAY       Cholesterol Medication Protocol Passed - 12/13/2024 11:49 AM        Passed - ALT < 80     Lab Results   Component Value Date    ALT 56 (H) 11/26/2024             Passed - ALT resulted within past year        Passed - Lipid panel within past 12 months     Lab Results   Component Value Date    CHOLEST 203 (H) 11/26/2024    TRIG 105 11/26/2024    HDL 54 11/26/2024     (H) 11/26/2024    VLDL 19 11/26/2024    NONHDLC 149 (H) 11/26/2024             Passed - In person appointment or virtual visit in the past 12 mos or appointment in next 3 mos     Recent Outpatient Visits              2 weeks ago [facility]    [facility] Visit    5 months ago Type 2 diabetes mellitus without complication, without long-term current use of insulin ([facility])    [facility]    [facility] Visit    11 months ago Type 2 diabetes mellitus without complication, without long-term current use of insulin ([facility])    [facility]    Office Visit    1 year ago Type 2 diabetes mellitus without complication, without long-term current use of insulin ([facility])    [facility]    [facility] Visit    1 year ago     [facility]    Nurse Only          Future Appointments         Provider Department Appt Notes    In 1 week [facility]                         Future Appointments         Provider Department Appt Notes    In 1 week Abraham Whiting MD [facility] 3wk follow up          Recent Outpatient Visits              2 weeks ago Physical exam    [facility], Abraham Dias MD    Office Visit    5 months ago Type 2 diabetes mellitus without complication, without long-term current use of insulin ([facility]    [facility] Visit    11 months ago Type 2 diabetes mellitus without complication, without long-term current use of insulin ([facility])    [facility], Abraham Dias MD    Office Visit    1 year ago Type 2 diabetes mellitus without complication, without long-term current use of insulin ([facility])    [facility], Abraham Dias MD    Office Visit    1 year ago     [facility]    Nurse Only

## 2024-12-14 RX ORDER — ATORVASTATIN CALCIUM 20 MG/1
20 TABLET, FILM COATED ORAL DAILY
Qty: 90 TABLET | Refills: 3 | Status: SHIPPED | OUTPATIENT
Start: 2024-12-14

## 2024-12-20 ENCOUNTER — OFFICE VISIT (OUTPATIENT)
Dept: FAMILY MEDICINE CLINIC | Facility: CLINIC | Age: 54
End: 2024-12-20

## 2024-12-20 VITALS
DIASTOLIC BLOOD PRESSURE: 82 MMHG | WEIGHT: 267 LBS | HEIGHT: 72 IN | BODY MASS INDEX: 36.16 KG/M2 | SYSTOLIC BLOOD PRESSURE: 139 MMHG | HEART RATE: 62 BPM

## 2024-12-20 DIAGNOSIS — I10 PRIMARY HYPERTENSION: Primary | ICD-10-CM

## 2024-12-20 PROCEDURE — 3079F DIAST BP 80-89 MM HG: CPT | Performed by: FAMILY MEDICINE

## 2024-12-20 PROCEDURE — 3075F SYST BP GE 130 - 139MM HG: CPT | Performed by: FAMILY MEDICINE

## 2024-12-20 PROCEDURE — 99213 OFFICE O/P EST LOW 20 MIN: CPT | Performed by: FAMILY MEDICINE

## 2024-12-20 PROCEDURE — 3008F BODY MASS INDEX DOCD: CPT | Performed by: FAMILY MEDICINE

## 2024-12-20 PROCEDURE — 3044F HG A1C LEVEL LT 7.0%: CPT | Performed by: FAMILY MEDICINE

## 2024-12-20 NOTE — PROGRESS NOTES
12/20/2024  9:04 AM    Madno Pisano is a 54 year old male.    Chief complaint(s):   Chief Complaint   Patient presents with    Hypertension     HPI:     Mando Pisano primary complaint is regarding HTN.     Mando Pisanois a 54 year old male presents with hypertension.  This was first diagnosed more than 7 years ago.  Current nonpharmacologic treatment includes low sodium diet, exercise, and meditation.  His current cardiac medication(s) regimen includes: Amlodipine 5 mg, Olmesartan- hydrochlorothiazide 20-12.5 mg .  He has not kept a blood pressure diary, but states that his blood pressures have been fair controll.  He is tolerating his medication(s)  well without side effects.  Compliance with treatment has been fair.  Diabetes has been well controlled.         HISTORY:  Past Medical History:    Essential hypertension    GERD (gastroesophageal reflux disease)    Mixed hyperlipidemia      Past Surgical History:   Procedure Laterality Date    Colonoscopy        Family History   Problem Relation Age of Onset    Diabetes Mother     Hypertension Mother     Other (Other) Mother         CVA    Hypertension Brother     Cancer Neg     Heart Disorder Neg       Social History:   Social History     Socioeconomic History    Marital status:    Tobacco Use    Smoking status: Never     Passive exposure: Never    Smokeless tobacco: Never   Vaping Use    Vaping status: Never Used   Substance and Sexual Activity    Alcohol use: Yes     Comment: occ    Drug use: No        Immunizations:   Immunization History   Administered Date(s) Administered    Covid-19 Vaccine Pfizer 30 mcg/0.3 ml 04/10/2021, 04/27/2021    Influenza 09/06/2013, 10/17/2022    Pneumococcal Conjugate PCV20 07/05/2024    TDAP 04/29/2022    Zoster Vaccine Recombinant Adjuvanted (Shingrix) 04/15/2022, 07/02/2022   Pended Date(s) Pended    Influenza Vaccine, trivalent (IIV3), PF 0.5mL (72165) 11/26/2024   Deferred Date(s) Deferred    Influenza 09/06/2013, 01/08/2016,  10/14/2016       Medications (Active prior to today's visit):  Current Outpatient Medications   Medication Sig Dispense Refill    atorvastatin 20 MG Oral Tab Take 1 tablet (20 mg total) by mouth daily. 90 tablet 3    metFORMIN 500 MG Oral Tab Take 1 tablet (500 mg total) by mouth daily with breakfast. 90 tablet 3    amLODIPine 5 MG Oral Tab Take 1 tablet (5 mg total) by mouth daily. 90 tablet 3    Olmesartan Medoxomil-HCTZ 20-12.5 MG Oral Tab Take 1 tablet by mouth daily. 90 tablet 3    Lancets Does not apply Misc Test once daily 100 each 3       Allergies:  Allergies[1]      ROS:   Review of Systems   Constitutional:  Negative for appetite change, fatigue and fever.   Respiratory:  Negative for shortness of breath.    Cardiovascular:  Negative for chest pain, palpitations and leg swelling.   Gastrointestinal:  Negative for abdominal pain.   Musculoskeletal:  Negative for myalgias.   Skin:  Negative for rash.   Neurological:  Negative for dizziness, weakness and headaches.       PHYSICAL EXAM:   VS: /82   Pulse 62   Ht 6' (1.829 m)   Wt 267 lb (121.1 kg)   BMI 36.21 kg/m²     Physical Exam  Vitals reviewed.   Constitutional:       Appearance: Normal appearance. He is well-developed.   HENT:      Head: Normocephalic.   Eyes:      General: No scleral icterus.     Conjunctiva/sclera: Conjunctivae normal.   Cardiovascular:      Rate and Rhythm: Normal rate and regular rhythm.   Pulmonary:      Effort: Pulmonary effort is normal.   Musculoskeletal:      Cervical back: Neck supple.   Skin:     Findings: No rash.   Psychiatric:         Mood and Affect: Mood normal.         LABORATORY RESULTS:   No results found for: \"URCOLOR\", \"URCLA\", \"URINELEUK\", \"URINENITRITE\", \"URINEBLOOD\"   Results for orders placed or performed in visit on 11/26/24   CBC With Differential With Platelet    Collection Time: 11/26/24 10:29 AM   Result Value Ref Range    WBC 9.0 4.0 - 11.0 x10(3) uL    RBC 5.07 4.30 - 5.70 x10(6)uL    HGB 14.9  13.0 - 17.5 g/dL    HCT 44.0 39.0 - 53.0 %    MCV 86.8 80.0 - 100.0 fL    MCH 29.4 26.0 - 34.0 pg    MCHC 33.9 31.0 - 37.0 g/dL    RDW-SD 39.9 35.1 - 46.3 fL    RDW 12.6 11.0 - 15.0 %    .0 150.0 - 450.0 10(3)uL    Neutrophil Absolute Prelim 4.92 1.50 - 7.70 x10 (3) uL    Neutrophil Absolute 4.92 1.50 - 7.70 x10(3) uL    Lymphocyte Absolute 2.51 1.00 - 4.00 x10(3) uL    Monocyte Absolute 0.71 0.10 - 1.00 x10(3) uL    Eosinophil Absolute 0.70 0.00 - 0.70 x10(3) uL    Basophil Absolute 0.10 0.00 - 0.20 x10(3) uL    Immature Granulocyte Absolute 0.05 0.00 - 1.00 x10(3) uL    Neutrophil % 54.7 %    Lymphocyte % 27.9 %    Monocyte % 7.9 %    Eosinophil % 7.8 %    Basophil % 1.1 %    Immature Granulocyte % 0.6 %   Comp Metabolic Panel (14)    Collection Time: 11/26/24 10:29 AM   Result Value Ref Range    Glucose 143 (H) 70 - 99 mg/dL    Sodium 138 136 - 145 mmol/L    Potassium 4.0 3.5 - 5.1 mmol/L    Chloride 103 98 - 112 mmol/L    CO2 26.0 21.0 - 32.0 mmol/L    Anion Gap 9 0 - 18 mmol/L    BUN 10 9 - 23 mg/dL    Creatinine 0.62 (L) 0.70 - 1.30 mg/dL    BUN/CREA Ratio 16.1 10.0 - 20.0    Calcium, Total 10.6 (H) 8.7 - 10.4 mg/dL    Calculated Osmolality 288 275 - 295 mOsm/kg    eGFR-Cr 115 >=60 mL/min/1.73m2    ALT 56 (H) 10 - 49 U/L    AST 48 (H) <34 U/L    Alkaline Phosphatase 47 45 - 117 U/L    Bilirubin, Total 0.7 0.3 - 1.2 mg/dL    Total Protein 7.9 5.7 - 8.2 g/dL    Albumin 5.2 (H) 3.2 - 4.8 g/dL    Globulin  2.7 2.0 - 3.5 g/dL    A/G Ratio 1.9 1.0 - 2.0    Patient Fasting for CMP? Yes    Hemoglobin A1C    Collection Time: 11/26/24 10:29 AM   Result Value Ref Range    HgbA1C 6.6 (H) <5.7 %    Estimated Average Glucose 143 (H) 68 - 126 mg/dL   Lipid Panel    Collection Time: 11/26/24 10:29 AM   Result Value Ref Range    Cholesterol, Total 203 (H) <200 mg/dL    HDL Cholesterol 54 40 - 59 mg/dL    Triglycerides 105 30 - 149 mg/dL    LDL Cholesterol 130 (H) <100 mg/dL    VLDL 19 0 - 30 mg/dL    Non HDL Chol 149  (H) <130 mg/dL    Patient Fasting for Lipid? Yes    PSA, Total W Reflex To Free    Collection Time: 11/26/24 10:29 AM   Result Value Ref Range    Prostate Specific Antigen 1.7 0.0 - 4.0 ng/mL    Reflex Criteria Comment    TSH W Reflex To Free T4    Collection Time: 11/26/24 10:29 AM   Result Value Ref Range    TSH 1.741 0.550 - 4.780 uIU/mL   Urinalysis with Culture Reflex    Collection Time: 11/26/24 10:29 AM    Specimen: Urine   Result Value Ref Range    Urine Color Colorless (A) Yellow    Clarity Urine Clear Clear    Spec Gravity 1.008 1.005 - 1.030    Glucose Urine Normal Normal mg/dL    Bilirubin Urine Negative Negative    Ketones Urine Negative Negative mg/dL    Blood Urine Negative Negative    pH Urine 7.5 5.0 - 8.0    Protein Urine Negative Negative mg/dL    Urobilinogen Urine Normal Normal    Nitrite Urine Negative Negative    Leukocyte Esterase Urine Negative Negative    Microscopic Microscopic not indicated    Uric Acid    Collection Time: 11/26/24 10:29 AM   Result Value Ref Range    Uric Acid 6.7 3.7 - 9.2 mg/dL   Vitamin D, 25-Hydroxy    Collection Time: 11/26/24 10:29 AM   Result Value Ref Range    Vitamin D, 25OH, Total 52.7 30.0 - 100.0 ng/mL       EKG / Spirometry : -     Radiology: No results found.     ASSESSMENT/PLAN:   Assessment   Encounter Diagnosis   Name Primary?    Primary hypertension Yes       MEDICATIONS: CPM  RECOMMENDATIONS given include: avoid pseudoephedrine or other stimulants/decongestants in common cold remedies, decrease consumption of alcohol, perform routine monitoring of blood pressure with home blood pressure cuff, exercise, reduction of dietary salt intake, take medication as prescribed, try not to miss doses, smoking cessation, weight loss, and stress reduction.    FOLLOW-UP: Schedule a follow-up visit in 6 months.              Orders This Visit:  No orders of the defined types were placed in this encounter.      Meds This Visit:  Requested Prescriptions      No  prescriptions requested or ordered in this encounter       Imaging & Referrals:  None         LISA JACOB MD         [1] No Known Allergies

## 2025-05-08 ENCOUNTER — OFFICE VISIT (OUTPATIENT)
Dept: FAMILY MEDICINE CLINIC | Facility: CLINIC | Age: 55
End: 2025-05-08
Payer: COMMERCIAL

## 2025-05-08 VITALS
HEIGHT: 72 IN | WEIGHT: 265.38 LBS | BODY MASS INDEX: 35.94 KG/M2 | SYSTOLIC BLOOD PRESSURE: 160 MMHG | HEART RATE: 65 BPM | DIASTOLIC BLOOD PRESSURE: 88 MMHG

## 2025-05-08 DIAGNOSIS — E11.9 TYPE 2 DIABETES MELLITUS WITHOUT COMPLICATION, WITHOUT LONG-TERM CURRENT USE OF INSULIN (HCC): Primary | ICD-10-CM

## 2025-05-08 DIAGNOSIS — I10 PRIMARY HYPERTENSION: ICD-10-CM

## 2025-05-08 LAB — HEMOGLOBIN A1C: 6 % (ref 4.3–5.6)

## 2025-05-08 PROCEDURE — 83036 HEMOGLOBIN GLYCOSYLATED A1C: CPT | Performed by: FAMILY MEDICINE

## 2025-05-08 PROCEDURE — 99214 OFFICE O/P EST MOD 30 MIN: CPT | Performed by: FAMILY MEDICINE

## 2025-05-08 PROCEDURE — 3008F BODY MASS INDEX DOCD: CPT | Performed by: FAMILY MEDICINE

## 2025-05-08 PROCEDURE — 3079F DIAST BP 80-89 MM HG: CPT | Performed by: FAMILY MEDICINE

## 2025-05-08 PROCEDURE — 3077F SYST BP >= 140 MM HG: CPT | Performed by: FAMILY MEDICINE

## 2025-05-08 PROCEDURE — 3044F HG A1C LEVEL LT 7.0%: CPT | Performed by: FAMILY MEDICINE

## 2025-05-08 RX ORDER — CHLORTHALIDONE 25 MG/1
12.5 TABLET ORAL DAILY
Qty: 30 TABLET | Refills: 3 | Status: SHIPPED | OUTPATIENT
Start: 2025-05-08

## 2025-05-08 RX ORDER — OLMESARTAN MEDOXOMIL 40 MG/1
40 TABLET ORAL DAILY
Qty: 30 TABLET | Refills: 3 | Status: SHIPPED | OUTPATIENT
Start: 2025-05-08

## 2025-05-08 NOTE — PROGRESS NOTES
5/8/2025  9:41 AM    Mando Pisano is a 55 year old male.    Chief complaint(s):   Chief Complaint   Patient presents with    Hypertension     Pt has been checking BP at home, states reading have been stable, rises with certain activities.     HPI:     Mando Pisano primary complaint is regarding HTN.     Patient Mando Pisano is a 55 year old male is here to be evaluated for type 2 diabetes.  Specifically, male has type 2, insulin none requiring diabetes. Compliance with treatment has been fair.  Patient's diabetes was first diagnosed  Oct 2022 years ago.  Patient follows a 2000 calorie ADA diet.  Patient report experiencing the following diabetes related symptoms; Negative for polyuria, Negative for polydipsia, Negative for blurred vision.  Depression symptoms include none.  Tobacco screen: none  smoker.  Current meds include :  oral hypoglycemic include: Metformin 500 mg  insulin/injectable : - .  Hypoglycemia severity is not applicable.he reports home blood glucose readings have been ? (#s) and believes  having fair glucose control.  Most recent lab results include glycohemoglobin 6.1 %, microalbuminuria have been ?.  In regard to preventative care, his last ophthalmology exam was in 2-3 weeks ago.  Opthalmic evaluation have shown - pathology.  Concurrent relative health problems include HTN and hyperlipidemia      Mando Zhang a 55 year old male presents with hypertension.  This was first diagnosed more than 7 years ago.  Current nonpharmacologic treatment includes low sodium diet, exercise, and meditation.  His current cardiac medication(s) regimen includes: Amlodipine 5 mg, Olmesartan- hydrochlorothiazide 20-12.5 mg .  He has not kept a blood pressure diary, but states that his blood pressures have been fair controll.  He is tolerating his medication(s)  well without side effects.  Compliance with treatment has been fair.  Diabetes has been well controlled.       HISTORY:  Past Medical History[1]   Past Surgical  History[2]   Family History[3]   Social History: Short Social Hx on File[4]     Immunizations:   Immunization History   Administered Date(s) Administered    Covid-19 Vaccine Pfizer 30 mcg/0.3 ml 04/10/2021, 04/27/2021    Influenza 09/06/2013, 10/17/2022    Pneumococcal Conjugate PCV20 07/05/2024    TDAP 04/29/2022    Zoster Vaccine Recombinant Adjuvanted (Shingrix) 04/15/2022, 07/02/2022   Pended Date(s) Pended    Influenza Vaccine, trivalent (IIV3), PF 0.5mL (85195) 11/26/2024   Deferred Date(s) Deferred    Influenza 09/06/2013, 01/08/2016, 10/14/2016       Medications (Active prior to today's visit):  Current Medications[5]    Allergies:  Allergies[6]      ROS:   Review of Systems   Constitutional:  Negative for appetite change, fatigue and fever.   Respiratory:  Negative for shortness of breath.    Cardiovascular:  Negative for chest pain, palpitations and leg swelling.   Gastrointestinal:  Negative for abdominal pain.   Endocrine: Negative for polydipsia and polyuria.   Musculoskeletal:  Negative for myalgias.   Skin:  Negative for rash.   Neurological:  Negative for dizziness, weakness and headaches.       PHYSICAL EXAM:   VS: /88   Pulse 65   Ht 6' (1.829 m)   Wt 265 lb 6.4 oz (120.4 kg)   BMI 35.99 kg/m²     Physical Exam  Vitals reviewed.   Constitutional:       Appearance: Normal appearance. He is well-developed.   HENT:      Head: Normocephalic.   Eyes:      General: No scleral icterus.     Conjunctiva/sclera: Conjunctivae normal.   Cardiovascular:      Rate and Rhythm: Normal rate and regular rhythm.   Pulmonary:      Effort: Pulmonary effort is normal.      Breath sounds: Normal breath sounds.   Musculoskeletal:      Cervical back: Neck supple.   Feet:      Right foot:      Protective Sensation: 10 sites tested.  10 sites sensed.      Left foot:      Protective Sensation: 10 sites tested.  10 sites sensed.   Skin:     Findings: No rash.   Psychiatric:         Mood and Affect: Mood normal.          LABORATORY RESULTS:   No results found for: \"URCOLOR\", \"URCLA\", \"URINELEUK\", \"URINENITRITE\", \"URINEBLOOD\"   Results for orders placed or performed in visit on 05/08/25   POC Glycohemoglobin [56975]    Collection Time: 05/08/25  9:54 AM   Result Value Ref Range    HEMOGLOBIN A1C 6.0 (A) 4.3 - 5.6 %    Cartridge Lot# [id number] Numeric    Cartridge Expiration Date 11/6/26 Date       EKG / Spirometry : -     Radiology: No results found.     ASSESSMENT/PLAN:   Assessment   Encounter Diagnoses   Name Primary?    Type 2 diabetes mellitus without complication, without long-term current use of insulin (HCC) Yes    Primary hypertension        1. Type 2 diabetes mellitus without complication, without long-term current use of insulin (HCC)    DIABETES A&P    LABORATORY & ORDERS: Blood test(s) ordered today ;   Orders Placed This Encounter   Procedures    POC Glycohemoglobin [93430]    Microalb/Creat Ratio, Random Urine     Additional orders include:   MEDICATIONS:  Current Medications[7].  Requested Prescriptions     Signed Prescriptions Disp Refills    Olmesartan Medoxomil 40 MG Oral Tab 30 tablet 3     Sig: Take 1 tablet (40 mg total) by mouth daily.    chlorthalidone 25 MG Oral Tab 30 tablet 3     Sig: Take 0.5 tablets (12.5 mg total) by mouth daily.      REFERRALS:       Procedures    POC Glycohemoglobin [[id number]]    Microalb/Creat Ratio, Random Urine     RECOMMENDATIONS: instructed in use of glucometer ( check fasting glucose rarely), return for training in administering insulin injections, adherence to an 1800 calorie ADA diet,  5 pound weight loss, a graduated exercise program, HgbA1C level checked quarterly, daily foot self-inspection, need for yearly flu shots, and avoid all sodas, juices, candy, chocolates, cakes, ice cream, etc.      FOLLOW-UP: Schedule a follow-up visit in 6 months.         2. Primary hypertension    MEDICATIONS:   Requested Prescriptions     Signed Prescriptions Disp Refills    Olmesartan  Medoxomil 40 MG Oral Tab 30 tablet 3     Sig: Take 1 tablet (40 mg total) by mouth daily.    chlorthalidone 25 MG Oral Tab 30 tablet 3     Sig: Take 0.5 tablets (12.5 mg total) by mouth daily.      LABORATORY & ORDERS:   Orders Placed This Encounter   Procedures    POC Glycohemoglobin [[id number]]    Microalb/Creat Ratio, Random Urine     RECOMMENDATIONS given include: avoid pseudoephedrine or other stimulants/decongestants in common cold remedies, decrease consumption of alcohol, perform routine monitoring of blood pressure with home blood pressure cuff, exercise, reduction of dietary salt intake, take medication as prescribed, try not to miss doses, smoking cessation, weight loss, and stress reduction.    FOLLOW-UP: Schedule a follow-up visit in 3 months.               Orders This Visit:  Orders Placed This Encounter   Procedures    POC Glycohemoglobin [[id number]]    Microalb/Creat Ratio, Random Urine       Meds This Visit:  Requested Prescriptions     Signed Prescriptions Disp Refills    Olmesartan Medoxomil 40 MG Oral Tab 30 tablet 3     Sig: Take 1 tablet (40 mg total) by mouth daily.    chlorthalidone 25 MG Oral Tab 30 tablet 3     Sig: Take 0.5 tablets (12.5 mg total) by mouth daily.       Imaging & Referrals:  None         LISA JACOB MD         [1]   Past Medical History:   Essential hypertension    GERD (gastroesophageal reflux disease)    Mixed hyperlipidemia   [2]   Past Surgical History:  Procedure Laterality Date    Colonoscopy     [3]   Family History  Problem Relation Age of Onset    Diabetes Mother     Hypertension Mother     Other (Other) Mother         CVA    Hypertension Brother     Cancer Neg     Heart Disorder Neg    [4]   Social History  Socioeconomic History    Marital status:    Tobacco Use    Smoking status: Never     Passive exposure: Never    Smokeless tobacco: Never   Vaping Use    Vaping status: Never Used   Substance and Sexual Activity    Alcohol use: Yes     Comment: occ    Drug  use: No   [5]   Current Outpatient Medications   Medication Sig Dispense Refill    Olmesartan Medoxomil 40 MG Oral Tab Take 1 tablet (40 mg total) by mouth daily. 30 tablet 3    chlorthalidone 25 MG Oral Tab Take 0.5 tablets (12.5 mg total) by mouth daily. 30 tablet 3    atorvastatin 20 MG Oral Tab Take 1 tablet (20 mg total) by mouth daily. 90 tablet 3    metFORMIN 500 MG Oral Tab Take 1 tablet (500 mg total) by mouth daily with breakfast. 90 tablet 3    amLODIPine 5 MG Oral Tab Take 1 tablet (5 mg total) by mouth daily. 90 tablet 3    Lancets Does not apply Misc Test once daily 100 each 3   [6] No Known Allergies  [7]   Olmesartan Medoxomil 40 MG Oral Tab, Take 1 tablet (40 mg total) by mouth daily., Disp: 30 tablet, Rfl: 3    chlorthalidone 25 MG Oral Tab, Take 0.5 tablets (12.5 mg total) by mouth daily., Disp: 30 tablet, Rfl: 3    atorvastatin 20 MG Oral Tab, Take 1 tablet (20 mg total) by mouth daily., Disp: 90 tablet, Rfl: 3    metFORMIN 500 MG Oral Tab, Take 1 tablet (500 mg total) by mouth daily with breakfast., Disp: 90 tablet, Rfl: 3    amLODIPine 5 MG Oral Tab, Take 1 tablet (5 mg total) by mouth daily., Disp: 90 tablet, Rfl: 3    Lancets Does not apply Misc, Test once daily, Disp: 100 each, Rfl: 3

## 2025-07-12 ENCOUNTER — TELEPHONE (OUTPATIENT)
Dept: FAMILY MEDICINE CLINIC | Facility: CLINIC | Age: 55
End: 2025-07-12

## 2025-07-12 NOTE — TELEPHONE ENCOUNTER
Patient called to ask Dr. Whiting or a nurse a question regarding the medication Olmesartan Medoxomil 40 MG and if that causes Irritated/Sore Throat as a side effect.     Please call.

## 2025-07-12 NOTE — TELEPHONE ENCOUNTER
Patient states he recently started olmesartan and chlorthalidone. He has a sore throat that comes and goes. I advised him that it is a side effect that may be mild and temporary. He denies severe symptoms like shortness of breath, fever, difficulty swallowing. He states he will monitor the side effects as his body adjusts and will schedule an appointment with Dr. Whiting when he has his calendar in front of him.